# Patient Record
Sex: FEMALE | Race: BLACK OR AFRICAN AMERICAN | NOT HISPANIC OR LATINO | Employment: OTHER | URBAN - METROPOLITAN AREA
[De-identification: names, ages, dates, MRNs, and addresses within clinical notes are randomized per-mention and may not be internally consistent; named-entity substitution may affect disease eponyms.]

---

## 2024-01-11 ENCOUNTER — V-VISIT (OUTPATIENT)
Dept: URGENT CARE | Age: 64
End: 2024-01-11

## 2024-01-11 VITALS
SYSTOLIC BLOOD PRESSURE: 141 MMHG | HEART RATE: 83 BPM | WEIGHT: 199 LBS | RESPIRATION RATE: 16 BRPM | OXYGEN SATURATION: 99 % | TEMPERATURE: 96.9 F | HEIGHT: 64 IN | DIASTOLIC BLOOD PRESSURE: 82 MMHG | BODY MASS INDEX: 33.97 KG/M2

## 2024-01-11 DIAGNOSIS — U07.1 COVID-19 VIRUS INFECTION: Primary | ICD-10-CM

## 2024-01-11 LAB
FLUAV AG UPPER RESP QL IA.RAPID: NEGATIVE
FLUBV AG UPPER RESP QL IA.RAPID: NEGATIVE
SARS-COV+SARS-COV-2 AG RESP QL IA.RAPID: DETECTED
TEST LOT EXPIRATION DATE: ABNORMAL
TEST LOT NUMBER: ABNORMAL

## 2024-01-11 PROCEDURE — 99202 OFFICE O/P NEW SF 15 MIN: CPT | Performed by: NURSE PRACTITIONER

## 2024-01-11 PROCEDURE — 87428 SARSCOV & INF VIR A&B AG IA: CPT | Performed by: NURSE PRACTITIONER

## 2024-01-11 RX ORDER — KETOCONAZOLE 20 MG/G
1 CREAM TOPICAL 2 TIMES DAILY
COMMUNITY
Start: 2024-01-04

## 2024-01-11 RX ORDER — PREDNISONE 20 MG/1
40 TABLET ORAL DAILY
Qty: 10 TABLET | Refills: 0 | Status: SHIPPED | OUTPATIENT
Start: 2024-01-11 | End: 2024-01-16

## 2024-01-11 RX ORDER — TRIAMCINOLONE ACETONIDE 1 MG/G
1 OINTMENT TOPICAL 2 TIMES DAILY
COMMUNITY
Start: 2024-01-05

## 2024-01-11 RX ORDER — ATORVASTATIN CALCIUM 20 MG/1
20 TABLET, FILM COATED ORAL DAILY
COMMUNITY
Start: 2023-11-29

## 2024-01-11 RX ORDER — FLUTICASONE PROPIONATE 50 MCG
1 SPRAY, SUSPENSION (ML) NASAL DAILY
COMMUNITY
Start: 2023-12-26

## 2024-01-11 RX ORDER — ALBUTEROL SULFATE 90 UG/1
2 AEROSOL, METERED RESPIRATORY (INHALATION) EVERY 4 HOURS PRN
COMMUNITY
Start: 2024-01-04

## 2024-01-11 RX ORDER — LOSARTAN POTASSIUM AND HYDROCHLOROTHIAZIDE 12.5; 1 MG/1; MG/1
1 TABLET ORAL DAILY
COMMUNITY
Start: 2023-12-07

## 2024-01-11 ASSESSMENT — ENCOUNTER SYMPTOMS
CHILLS: 1
WHEEZING: 1
SORE THROAT: 0
FEVER: 0
SHORTNESS OF BREATH: 0
COUGH: 1

## 2024-01-11 ASSESSMENT — PAIN SCALES - GENERAL: PAINLEVEL: 0

## 2024-01-31 ENCOUNTER — HOSPITAL ENCOUNTER (OUTPATIENT)
Dept: GENERAL RADIOLOGY | Age: 64
Discharge: HOME | End: 2024-01-31
Attending: FAMILY MEDICINE
Payer: MEDICARE

## 2024-01-31 ENCOUNTER — OFFICE VISIT (OUTPATIENT)
Dept: FAMILY MEDICINE CLINIC | Facility: CLINIC | Age: 64
End: 2024-01-31
Payer: COMMERCIAL

## 2024-01-31 VITALS
BODY MASS INDEX: 35.07 KG/M2 | OXYGEN SATURATION: 98 % | HEART RATE: 88 BPM | HEIGHT: 62.25 IN | SYSTOLIC BLOOD PRESSURE: 110 MMHG | WEIGHT: 193 LBS | DIASTOLIC BLOOD PRESSURE: 68 MMHG | RESPIRATION RATE: 16 BRPM

## 2024-01-31 DIAGNOSIS — G57.11 MERALGIA PARESTHETICA OF RIGHT SIDE: ICD-10-CM

## 2024-01-31 DIAGNOSIS — J30.9 ALLERGIC RHINITIS, UNSPECIFIED SEASONALITY, UNSPECIFIED TRIGGER: ICD-10-CM

## 2024-01-31 DIAGNOSIS — E11.9 TYPE 2 DIABETES MELLITUS WITHOUT COMPLICATION, WITHOUT LONG-TERM CURRENT USE OF INSULIN (HCC): Primary | ICD-10-CM

## 2024-01-31 DIAGNOSIS — Z78.0 POST-MENOPAUSAL: ICD-10-CM

## 2024-01-31 DIAGNOSIS — J45.20 MILD INTERMITTENT ASTHMA WITHOUT COMPLICATION: ICD-10-CM

## 2024-01-31 DIAGNOSIS — Z12.31 ENCOUNTER FOR SCREENING MAMMOGRAM FOR BREAST CANCER: ICD-10-CM

## 2024-01-31 DIAGNOSIS — I10 ESSENTIAL HYPERTENSION: ICD-10-CM

## 2024-01-31 DIAGNOSIS — Z00.00 LABORATORY EXAMINATION ORDERED AS PART OF A ROUTINE GENERAL MEDICAL EXAMINATION: ICD-10-CM

## 2024-01-31 DIAGNOSIS — E78.5 HYPERLIPIDEMIA, UNSPECIFIED HYPERLIPIDEMIA TYPE: ICD-10-CM

## 2024-01-31 DIAGNOSIS — R05.2 SUBACUTE COUGH: ICD-10-CM

## 2024-01-31 PROBLEM — J44.89 ASTHMA WITH COPD (CHRONIC OBSTRUCTIVE PULMONARY DISEASE): Chronic | Status: ACTIVE | Noted: 2024-01-31

## 2024-01-31 PROBLEM — J44.89 ASTHMA WITH COPD (CHRONIC OBSTRUCTIVE PULMONARY DISEASE) (HCC): Chronic | Status: ACTIVE | Noted: 2024-01-31

## 2024-01-31 PROCEDURE — 71046 X-RAY EXAM CHEST 2 VIEWS: CPT | Performed by: FAMILY MEDICINE

## 2024-01-31 PROCEDURE — 3008F BODY MASS INDEX DOCD: CPT | Performed by: FAMILY MEDICINE

## 2024-01-31 PROCEDURE — 3078F DIAST BP <80 MM HG: CPT | Performed by: FAMILY MEDICINE

## 2024-01-31 PROCEDURE — 3074F SYST BP LT 130 MM HG: CPT | Performed by: FAMILY MEDICINE

## 2024-01-31 PROCEDURE — 99204 OFFICE O/P NEW MOD 45 MIN: CPT | Performed by: FAMILY MEDICINE

## 2024-01-31 RX ORDER — LOSARTAN POTASSIUM AND HYDROCHLOROTHIAZIDE 12.5; 1 MG/1; MG/1
1 TABLET ORAL DAILY
COMMUNITY
Start: 2023-12-07

## 2024-01-31 RX ORDER — BLOOD SUGAR DIAGNOSTIC
STRIP MISCELLANEOUS
Qty: 100 STRIP | Refills: 3 | Status: SHIPPED | OUTPATIENT
Start: 2024-01-31 | End: 2025-01-30

## 2024-01-31 RX ORDER — ALBUTEROL SULFATE 90 UG/1
2 AEROSOL, METERED RESPIRATORY (INHALATION) EVERY 4 HOURS PRN
COMMUNITY
Start: 2023-08-23

## 2024-01-31 RX ORDER — METHYLPREDNISOLONE 4 MG/1
TABLET ORAL
Qty: 21 EACH | Refills: 0 | Status: SHIPPED | OUTPATIENT
Start: 2024-01-31

## 2024-01-31 RX ORDER — LANCETS
EACH MISCELLANEOUS
Qty: 100 EACH | Refills: 3 | Status: SHIPPED | OUTPATIENT
Start: 2024-01-31

## 2024-01-31 RX ORDER — BLOOD-GLUCOSE METER
1 EACH MISCELLANEOUS DAILY
Qty: 1 KIT | Refills: 0 | Status: SHIPPED | OUTPATIENT
Start: 2024-01-31

## 2024-01-31 RX ORDER — TRIAMCINOLONE ACETONIDE 1 MG/G
1 OINTMENT TOPICAL 2 TIMES DAILY
COMMUNITY
Start: 2024-01-05

## 2024-01-31 RX ORDER — FLUTICASONE PROPIONATE 50 MCG
1 SPRAY, SUSPENSION (ML) NASAL DAILY
COMMUNITY
Start: 2023-08-23

## 2024-01-31 RX ORDER — ATORVASTATIN CALCIUM 20 MG/1
20 TABLET, FILM COATED ORAL DAILY
COMMUNITY
Start: 2023-08-23

## 2024-01-31 RX ORDER — KETOCONAZOLE 20 MG/G
1 CREAM TOPICAL 2 TIMES DAILY
COMMUNITY
Start: 2023-08-08

## 2024-01-31 RX ORDER — CHLORPHENIR/PHENYLEPH/ASPIRIN 2-7.8-325
1 TABLET, EFFERVESCENT ORAL DAILY
COMMUNITY
Start: 2022-07-28

## 2024-01-31 RX ORDER — BENZONATATE 200 MG/1
200 CAPSULE ORAL 3 TIMES DAILY PRN
Qty: 30 CAPSULE | Refills: 0 | Status: SHIPPED | OUTPATIENT
Start: 2024-01-31

## 2024-01-31 NOTE — PROGRESS NOTES
[facility] Office Note  Chief Complaint:   Chief Complaint   Patient presents with    [facility]     NP / Hx of hypertension and diabetes II x 20 yrs , control with medication, last A1C 7.2 %.       Cough     C/o on and off cough since November, Pt was recently sick with Covid x 1/11/2024. Pt currently using Ventolin every day.          HPI:   This is a 64 year old female coming in to establish care.     She has a history of hypertension that was diagnosed 20+ years ago.     She has a history of T2DM that was diagnosed 20+ years ago. She has been controlled on oral medications. Has not been on any insulin. Denies any hypoglycemic episodes. No complications.     Cough - she reports intermittent cough since 11/2023. She has had multiple URI in the past. She has h/o mild intermittent asthma. She does report wheezing. No fevers. Has had sinus congestion, no purulent rhinorrhea. No chest pain, dyspnea.     Reports R outer thigh numbness. No trauma or injury. Does report that she walks a lot. Denies any radiation to lower back or lower leg. Denies any weakness. Denies any L sided symptoms.     Past Medical History:   Diagnosis Date    Diabetes (HCC)     Hypertension      Past Surgical History:   Procedure Laterality Date    COLONOSCOPY  12/28/2023    HYSTERECTOMY       Social History:  Social History     Socioeconomic History    Marital status:    Tobacco Use    Smoking status: Never    Smokeless tobacco: Never   Vaping Use    Vaping Use: Never used   Substance and Sexual Activity    Alcohol use: Never    Drug use: Never     Family History:  Family History   Problem Relation Age of Onset    Diabetes Mother     Other (circulation problems) Mother     Colon Cancer Father      Allergies:  No Known Allergies  Current Meds:  Current Outpatient Medications   Medication Sig Dispense Refill    VENTOLIN  (90 Base) MCG/ACT Inhalation Aero Soln Inhale 2 puffs into the lungs every 4 (four)  hours as needed for Wheezing or Shortness of Breath.      ascorbic acid 500 MG Oral Chew Tab Chew 1 tablet (500 mg total) by mouth daily.      atorvastatin 20 MG Oral Tab Take 1 tablet (20 mg total) by mouth daily.      Cholecalciferol 50 MCG (2000 UT) Oral Cap Take 2,000 Units by mouth daily.      diclofenac 1 % External Gel Apply 1 Application topically as needed.      fluticasone propionate 50 MCG/ACT Nasal Suspension 1 spray by Nasal route daily.      ketoconazole 2 % External Cream Apply 1 Application topically 2 (two) times daily.      Losartan Potassium-HCTZ 100-12.5 MG Oral Tab Take 1 tablet by mouth daily.      metFORMIN HCl 1000 MG Oral Tab Take 1 tablet (1,000 mg total) by mouth in the morning and 1 tablet (1,000 mg total) before bedtime.      Multiple Vitamins-Minerals (ONE-A-DAY WOMENS 50 PLUS) Oral Tab Take 1 tablet by mouth daily.      Omega-3 Fatty Acids (FISH OIL) 1200 MG Oral Capsule Delayed Release Take 1 tablet by mouth daily.      triamcinolone 0.1 % External Ointment Apply 1 Application topically 2 (two) times daily.      Accu-Chek Softclix Lancets Does not apply Misc Use as directed to measure blood sugar once daily. 100 each 3    Blood Glucose Monitoring Suppl (ACCU-CHEK GUIDE ME) w/Device Does not apply Kit 1 each daily. Use as directed to measure blood sugar once daily. 1 kit 0    Glucose Blood (ACCU-CHEK GUIDE) In Vitro Strip Use as directed to measure blood sugar once daily. 100 strip 3    benzonatate 200 MG Oral Cap Take 1 capsule (200 mg total) by mouth 3 (three) times daily as needed for cough. 30 capsule 0    methylPREDNISolone (MEDROL) 4 MG Oral Tablet Therapy Pack As directed. 21 each 0      Counseling given: Not Answered       REVIEW OF SYSTEMS:   Review of Systems   A comprehensive 10 point review of systems was completed.  Pertinent positives and negatives noted in the the HPI.    EXAM:   /68   Pulse 88   Resp 16   Ht 5' 2.25\" (1.581 m)   Wt 193 lb (87.5 kg)   LMP   (LMP Unknown)   SpO2 98%   BMI 35.02 kg/m²  Estimated body mass index is 35.02 kg/m² as calculated from the following:    Height as of this encounter: 5' 2.25\" (1.581 m).    Weight as of this encounter: 193 lb (87.5 kg).   Vital signs reviewed.Appears stated age, well groomed.  Physical Exam  Constitutional:       Appearance: Normal appearance.   HENT:      Head: Normocephalic and atraumatic.   Eyes:      Pupils: Pupils are equal, round, and reactive to light.   Cardiovascular:      Rate and Rhythm: Normal rate and regular rhythm.      Pulses: Normal pulses.      Heart sounds: Normal heart sounds. No murmur heard.  Pulmonary:      Effort: Pulmonary effort is normal. No respiratory distress.      Breath sounds: Normal breath sounds. No wheezing or rhonchi.   Abdominal:      General: Abdomen is flat. Bowel sounds are normal. There is no distension.      Palpations: Abdomen is soft. There is no mass.      Tenderness: There is no abdominal tenderness.      Hernia: No hernia is present.   Musculoskeletal:      Right hip: Normal. No deformity, tenderness, bony tenderness or crepitus. Normal range of motion. Normal strength.      Left hip: Normal.      Right lower leg: No edema.      Left lower leg: No edema.   Skin:     Findings: No rash.   Neurological:      General: No focal deficit present.      Mental Status: She is alert and oriented to person, place, and time.      Sensory: No sensory deficit.   Psychiatric:         Mood and Affect: Mood normal.          ASSESSMENT AND PLAN:   1. Type 2 diabetes mellitus without complication, without long-term current use of insulin (HCC)  Has been somewhat stable but could benefit from better control. Will obtain labs and adjust medications accordingly. She needs new RX for testing supplies, will order. Will f/u in 1mo. CPM.   - Hemoglobin A1C  - Microalb/Creat Ratio, Random Urine  - Accu-Chek Softclix Lancets Does not apply Misc; Use as directed to measure blood sugar once  daily.  Dispense: 100 each; Refill: 3  - Blood Glucose Monitoring Suppl (ACCU-CHEK GUIDE ME) w/Device Does not apply Kit; 1 each daily. Use as directed to measure blood sugar once daily.  Dispense: 1 kit; Refill: 0  - Glucose Blood (ACCU-CHEK GUIDE) In Vitro Strip; Use as directed to measure blood sugar once daily.  Dispense: 100 strip; Refill: 3    2. Essential hypertension  Stable, CPM. Due for labs.     3. Hyperlipidemia, unspecified hyperlipidemia type  Stable, CPM. Due for labs.     4. Encounter for screening mammogram for breast cancer  - [facility] WILLIAN 2D+3D SCREENING BILAT (CPT=77067/70435); Future    5. Laboratory examination ordered as part of a routine general medical examination  - CBC With Differential With Platelet  - Comp Metabolic Panel (14)  - Lipid Panel  - TSH W Reflex To Free T4    6. Post-menopausal  - XR DEXA BONE DENSITOMETRY (CPT=77080); Future    7. Subacute cough  Will obtain imaging. Trial tessalon perles. If no improvement can start medrol dose pack.  Reviewed return precautions. Consider course of abx if no improvement. F/u 1mo or sooner prn. Advised to send us condition update within the next week.   - XR CHEST PA + LAT CHEST (CPT=71046); Future  - benzonatate 200 MG Oral Cap; Take 1 capsule (200 mg total) by mouth 3 (three) times daily as needed for cough.  Dispense: 30 capsule; Refill: 0  - methylPREDNISolone (MEDROL) 4 MG Oral Tablet Therapy Pack; As directed.  Dispense: 21 each; Refill: 0    8. Mild intermittent asthma without complication  Stable, CPM. No wheezing auscultated on exam. See above.     9. Meralgia paresthetica of right side  Suspect R meralgia paresthetica. Provided w/ exercises.     10. Allergic rhinitis, unspecified seasonality, unspecified trigger  Advised on antihistamine, flonase, nasal saline spray.       Meds & Refills for this Visit:  Requested Prescriptions     Signed Prescriptions Disp Refills    Accu-Chek Softclix Lancets Does not apply Misc 100 each 3     Sig:  Use as directed to measure blood sugar once daily.    Blood Glucose Monitoring Suppl (ACCU-CHEK GUIDE ME) w/Device Does not apply Kit 1 kit 0     Si each daily. Use as directed to measure blood sugar once daily.    Glucose Blood (ACCU-CHEK GUIDE) In Vitro Strip 100 strip 3     Sig: Use as directed to measure blood sugar once daily.    benzonatate 200 MG Oral Cap 30 capsule 0     Sig: Take 1 capsule (200 mg total) by mouth 3 (three) times daily as needed for cough.    methylPREDNISolone (MEDROL) 4 MG Oral Tablet Therapy Pack 21 each 0     Sig: As directed.       Health Maintenance:  Health Maintenance Due   Topic Date Due    Annual Physical  Never done    Mammogram  Never done    COVID-19 Vaccine ( season) 2023    Annual Depression Screening  Never done    Fall Risk Screening (Annual)  Never done    DEXA Scan  Never done       Patient/Caregiver Education: Patient/Caregiver Education: There are no barriers to learning. Medical education done.   Outcome: Patient verbalizes understanding. Patient is notified to call with any questions, complications, allergies, or worsening or changing symptoms.  Patient is to call with any side effects or complications from the treatments as a result of today.     Problem List:  Patient Active Problem List   Diagnosis    Mild intermittent asthma without complication    Hyperlipidemia    Essential hypertension    Type 2 diabetes mellitus without complication, without long-term current use of insulin (HCC)    Asthma with COPD (chronic obstructive pulmonary disease) ([facility])

## 2024-01-31 NOTE — PATIENT INSTRUCTIONS
Complete labs  Complete mammogram  Complete bone density scan  Complete chest xray.   Start tessalon perles as needed for cough. If this doesn't improve your cough then please let us know. You can also start the steroid pack if cough doesn't improve with the tessalon perles. Make sure to take the steroid pills during the day so it doesn't cause you insomnia.   Start exercises for your right thigh numbness.

## 2024-02-01 ENCOUNTER — MED REC SCAN ONLY (OUTPATIENT)
Dept: FAMILY MEDICINE CLINIC | Facility: CLINIC | Age: 64
End: 2024-02-01

## 2024-02-01 ENCOUNTER — TELEPHONE (OUTPATIENT)
Dept: FAMILY MEDICINE CLINIC | Facility: CLINIC | Age: 64
End: 2024-02-01

## 2024-02-05 ENCOUNTER — LAB ENCOUNTER (OUTPATIENT)
Dept: LAB | Age: 64
End: 2024-02-05
Attending: FAMILY MEDICINE
Payer: MEDICARE

## 2024-02-05 LAB
ALBUMIN SERPL-MCNC: 3.6 G/DL (ref 3.4–5)
ALBUMIN/GLOB SERPL: 1.1 {RATIO} (ref 1–2)
ALP LIVER SERPL-CCNC: 76 U/L
ALT SERPL-CCNC: 21 U/L
ANION GAP SERPL CALC-SCNC: 5 MMOL/L (ref 0–18)
AST SERPL-CCNC: 13 U/L (ref 15–37)
BASOPHILS # BLD AUTO: 0.02 X10(3) UL (ref 0–0.2)
BASOPHILS NFR BLD AUTO: 0.5 %
BILIRUB SERPL-MCNC: 0.5 MG/DL (ref 0.1–2)
BUN BLD-MCNC: 9 MG/DL (ref 9–23)
CALCIUM BLD-MCNC: 9.5 MG/DL (ref 8.5–10.1)
CHLORIDE SERPL-SCNC: 107 MMOL/L (ref 98–112)
CHOLEST SERPL-MCNC: 141 MG/DL (ref ?–200)
CO2 SERPL-SCNC: 28 MMOL/L (ref 21–32)
CREAT BLD-MCNC: 0.73 MG/DL
CREAT UR-SCNC: 82.6 MG/DL
EGFRCR SERPLBLD CKD-EPI 2021: 91 ML/MIN/1.73M2 (ref 60–?)
EOSINOPHIL # BLD AUTO: 0.09 X10(3) UL (ref 0–0.7)
EOSINOPHIL NFR BLD AUTO: 2.4 %
ERYTHROCYTE [DISTWIDTH] IN BLOOD BY AUTOMATED COUNT: 15.7 %
EST. AVERAGE GLUCOSE BLD GHB EST-MCNC: 189 MG/DL (ref 68–126)
FASTING PATIENT LIPID ANSWER: YES
FASTING STATUS PATIENT QL REPORTED: YES
GLOBULIN PLAS-MCNC: 3.3 G/DL (ref 2.8–4.4)
GLUCOSE BLD-MCNC: 181 MG/DL (ref 70–99)
HBA1C MFR BLD: 8.2 % (ref ?–5.7)
HCT VFR BLD AUTO: 35.7 %
HDLC SERPL-MCNC: 91 MG/DL (ref 40–59)
HGB BLD-MCNC: 11.3 G/DL
IMM GRANULOCYTES # BLD AUTO: 0.01 X10(3) UL (ref 0–1)
IMM GRANULOCYTES NFR BLD: 0.3 %
LDLC SERPL CALC-MCNC: 39 MG/DL (ref ?–100)
LYMPHOCYTES # BLD AUTO: 1.62 X10(3) UL (ref 1–4)
LYMPHOCYTES NFR BLD AUTO: 43.8 %
MCH RBC QN AUTO: 29.3 PG (ref 26–34)
MCHC RBC AUTO-ENTMCNC: 31.7 G/DL (ref 31–37)
MCV RBC AUTO: 92.5 FL
MICROALBUMIN UR-MCNC: 0.68 MG/DL
MICROALBUMIN/CREAT 24H UR-RTO: 8.2 UG/MG (ref ?–30)
MONOCYTES # BLD AUTO: 0.35 X10(3) UL (ref 0.1–1)
MONOCYTES NFR BLD AUTO: 9.5 %
NEUTROPHILS # BLD AUTO: 1.61 X10 (3) UL (ref 1.5–7.7)
NEUTROPHILS # BLD AUTO: 1.61 X10(3) UL (ref 1.5–7.7)
NEUTROPHILS NFR BLD AUTO: 43.5 %
NONHDLC SERPL-MCNC: 50 MG/DL (ref ?–130)
OSMOLALITY SERPL CALC.SUM OF ELEC: 293 MOSM/KG (ref 275–295)
PLATELET # BLD AUTO: 294 10(3)UL (ref 150–450)
POTASSIUM SERPL-SCNC: 4.2 MMOL/L (ref 3.5–5.1)
PROT SERPL-MCNC: 6.9 G/DL (ref 6.4–8.2)
RBC # BLD AUTO: 3.86 X10(6)UL
SODIUM SERPL-SCNC: 140 MMOL/L (ref 136–145)
TRIGL SERPL-MCNC: 45 MG/DL (ref 30–149)
TSI SER-ACNC: 2.26 MIU/ML (ref 0.36–3.74)
VLDLC SERPL CALC-MCNC: 6 MG/DL (ref 0–30)
WBC # BLD AUTO: 3.7 X10(3) UL (ref 4–11)

## 2024-02-12 ENCOUNTER — HOSPITAL ENCOUNTER (OUTPATIENT)
Dept: BONE DENSITY | Age: 64
Discharge: HOME | End: 2024-02-12
Attending: FAMILY MEDICINE
Payer: MEDICARE

## 2024-02-12 ENCOUNTER — HOSPITAL ENCOUNTER (OUTPATIENT)
Dept: MAMMOGRAPHY | Age: 64
Discharge: HOME | End: 2024-02-12
Attending: FAMILY MEDICINE
Payer: MEDICARE

## 2024-02-12 DIAGNOSIS — Z12.31 ENCOUNTER FOR SCREENING MAMMOGRAM FOR BREAST CANCER: ICD-10-CM

## 2024-02-12 DIAGNOSIS — Z78.0 POST-MENOPAUSAL: ICD-10-CM

## 2024-02-12 PROCEDURE — 77063 BREAST TOMOSYNTHESIS BI: CPT | Performed by: FAMILY MEDICINE

## 2024-02-12 PROCEDURE — 77067 SCR MAMMO BI INCL CAD: CPT | Performed by: FAMILY MEDICINE

## 2024-02-12 PROCEDURE — 77080 DXA BONE DENSITY AXIAL: CPT | Performed by: FAMILY MEDICINE

## 2024-02-23 ENCOUNTER — TELEPHONE (OUTPATIENT)
Dept: FAMILY MEDICINE CLINIC | Facility: CLINIC | Age: 64
End: 2024-02-23

## 2024-02-29 ENCOUNTER — OFFICE VISIT (OUTPATIENT)
Dept: FAMILY MEDICINE CLINIC | Facility: CLINIC | Age: 64
End: 2024-02-29
Payer: COMMERCIAL

## 2024-02-29 VITALS
BODY MASS INDEX: 35.07 KG/M2 | HEIGHT: 62.25 IN | RESPIRATION RATE: 16 BRPM | OXYGEN SATURATION: 97 % | WEIGHT: 193 LBS | DIASTOLIC BLOOD PRESSURE: 68 MMHG | SYSTOLIC BLOOD PRESSURE: 112 MMHG | HEART RATE: 83 BPM

## 2024-02-29 DIAGNOSIS — B35.1 ONYCHOMYCOSIS: ICD-10-CM

## 2024-02-29 DIAGNOSIS — D64.9 ANEMIA, UNSPECIFIED TYPE: ICD-10-CM

## 2024-02-29 DIAGNOSIS — R05.2 SUBACUTE COUGH: ICD-10-CM

## 2024-02-29 DIAGNOSIS — J45.30 MILD PERSISTENT ASTHMA WITHOUT COMPLICATION (HCC): ICD-10-CM

## 2024-02-29 DIAGNOSIS — E11.9 TYPE 2 DIABETES MELLITUS WITHOUT COMPLICATION, WITHOUT LONG-TERM CURRENT USE OF INSULIN (HCC): Primary | ICD-10-CM

## 2024-02-29 PROBLEM — J44.89 ASTHMA WITH COPD (CHRONIC OBSTRUCTIVE PULMONARY DISEASE) (HCC): Chronic | Status: ACTIVE | Noted: 2024-02-29

## 2024-02-29 PROBLEM — J44.89 ASTHMA WITH COPD (CHRONIC OBSTRUCTIVE PULMONARY DISEASE): Chronic | Status: ACTIVE | Noted: 2024-02-29

## 2024-02-29 RX ORDER — FLUTICASONE PROPIONATE AND SALMETEROL 50; 100 UG/1; UG/1
1 POWDER RESPIRATORY (INHALATION) 2 TIMES DAILY
Qty: 60 EACH | Refills: 2 | Status: SHIPPED | OUTPATIENT
Start: 2024-02-29

## 2024-02-29 RX ORDER — FLASH GLUCOSE SENSOR
1 KIT MISCELLANEOUS
Qty: 2 EACH | Refills: 11 | Status: SHIPPED | OUTPATIENT
Start: 2024-02-29

## 2024-02-29 RX ORDER — FLASH GLUCOSE SCANNING READER
1 EACH MISCELLANEOUS ONCE
Qty: 1 EACH | Refills: 0 | Status: SHIPPED | OUTPATIENT
Start: 2024-02-29 | End: 2024-02-29

## 2024-02-29 NOTE — PROGRESS NOTES
[facility] Office Note  Chief Complaint:   Chief Complaint   Patient presents with    Follow - Up    Diabetes    Cough     Pt still c/o cough , Pt noticed improvement after done with Medrol dose pack but symptoms returned yesterday. Pt has been taking  Tessalon pearls / nasal spray and inhaler daily        HPI:   This is a 64 year old female coming in for    T2DM   -Last A1c value was 8.2% done 2024. A1c in  was 7.4%.   -Has had difficulty with her glucometer so she has not been able to check her blood sugar  -Denies any hypoglycemic changes.  -Has been trying to make dietary changes after seeing her most recent A1c value. Does walk daily for exercise.     Cough   -Improved w/ steroid pack but then returned after she completed it.   -She has been using albuterol w/ minimal relief for cough  -no new sx. No fevers, SOB, wheezing, chest tightness.   -CXR was negative for any PNA.         Past Medical History:   Diagnosis Date    Arthritis     Asthma (HCC)     Diabetes (HCC)     Hypertension      Past Surgical History:   Procedure Laterality Date    COLONOSCOPY  2023    COLONOSCOPY  2023    HYSTERECTOMY        1988    TUBAL LIGATION  20years     Social History:  Social History     Socioeconomic History    Marital status:    Tobacco Use    Smoking status: Never    Smokeless tobacco: Never   Vaping Use    Vaping Use: Never used   Substance and Sexual Activity    Alcohol use: Never    Drug use: Never   Other Topics Concern    Caffeine Concern No    Exercise No    Seat Belt No    Special Diet No    Stress Concern No    Weight Concern No     Family History:  Family History   Problem Relation Age of Onset    Diabetes Mother     Other (circulation problems) Mother     Colon Cancer Father     Cancer Father      Allergies:  No Known Allergies  Current Meds:  Current Outpatient Medications   Medication Sig Dispense Refill    VENTOLIN  (90 Base) MCG/ACT  Inhalation Aero Soln Inhale 2 puffs into the lungs every 4 (four) hours as needed for Wheezing or Shortness of Breath.      ascorbic acid 500 MG Oral Chew Tab Chew 1 tablet (500 mg total) by mouth daily.      atorvastatin 20 MG Oral Tab Take 1 tablet (20 mg total) by mouth daily.      Cholecalciferol 50 MCG (2000 UT) Oral Cap Take 2,000 Units by mouth daily.      diclofenac 1 % External Gel Apply 1 Application topically as needed.      fluticasone propionate 50 MCG/ACT Nasal Suspension 1 spray by Nasal route daily.      ketoconazole 2 % External Cream Apply 1 Application topically 2 (two) times daily.      Losartan Potassium-HCTZ 100-12.5 MG Oral Tab Take 1 tablet by mouth daily.      metFORMIN HCl 1000 MG Oral Tab Take 1 tablet (1,000 mg total) by mouth in the morning and 1 tablet (1,000 mg total) before bedtime.      Multiple Vitamins-Minerals (ONE-A-DAY WOMENS 50 PLUS) Oral Tab Take 1 tablet by mouth daily.      Omega-3 Fatty Acids (FISH OIL) 1200 MG Oral Capsule Delayed Release Take 1 tablet by mouth daily.      triamcinolone 0.1 % External Ointment Apply 1 Application topically 2 (two) times daily.      Accu-Chek Softclix Lancets Does not apply Misc Use as directed to measure blood sugar once daily. 100 each 3    Blood Glucose Monitoring Suppl (ACCU-CHEK GUIDE ME) w/Device Does not apply Kit 1 each daily. Use as directed to measure blood sugar once daily. 1 kit 0    Glucose Blood (ACCU-CHEK GUIDE) In Vitro Strip Use as directed to measure blood sugar once daily. 100 strip 3    benzonatate 200 MG Oral Cap Take 1 capsule (200 mg total) by mouth 3 (three) times daily as needed for cough. 30 capsule 0    TURMERIC OR Take 1 tablet by mouth As Directed.        Counseling given: Not Answered       REVIEW OF SYSTEMS:   Review of Systems   A comprehensive 10 point review of systems was completed.  Pertinent positives and negatives noted in the the HPI.    EXAM:   Pulse 83   Resp 16   Ht 5' 2.25\" (1.581 m)   Wt 193 lb  (87.5 kg)   LMP  (LMP Unknown)   SpO2 97%   BMI 35.02 kg/m²  Estimated body mass index is 35.02 kg/m² as calculated from the following:    Height as of this encounter: 5' 2.25\" (1.581 m).    Weight as of this encounter: 193 lb (87.5 kg).   Vital signs reviewed.Appears stated age, well groomed.  Physical Exam  Vitals and nursing note reviewed.   Constitutional:       Appearance: Normal appearance.   HENT:      Head: Normocephalic and atraumatic.   Cardiovascular:      Rate and Rhythm: Normal rate and regular rhythm.      Pulses: Normal pulses.      Heart sounds: Normal heart sounds. No murmur heard.  Pulmonary:      Effort: Pulmonary effort is normal. No respiratory distress.      Breath sounds: Normal breath sounds. No wheezing.   Skin:     Findings: No rash.   Neurological:      Mental Status: She is alert and oriented to person, place, and time.   Psychiatric:         Mood and Affect: Mood normal.      Bilateral barefoot skin diabetic exam is normal, visualized feet and the appearance is normal.  Bilateral monofilament/sensation of both feet is normal.  Pulsation pedal pulse exam of both lower legs/feet is normal as well. Onychomycosis b/l      ASSESSMENT AND PLAN:   1. Type 2 diabetes mellitus without complication, without long-term current use of insulin (HCC)  Needs better control, not at goal. Last A1c value was 8.2% done 2/5/2024.  . Wants to try lifestyle mgmt before adding additional agent for control. F/u 3mo.   Goal: A1c < 7   Medications: metformin   Diet & exercise: diabetic diet, regular exericse.   Daily accucheks are appropriate; will send in Rx for CGM.   Hypoglycemic episodes?: none   Labs: UTD  CV: continue statin.   Renal: UTD  Eye: due   Foot: UTD  Immunizations: UTD  RTC in 3mo for T2DM follow up.     - Continuous Blood Gluc  (FREESTYLE BHAVIN 14 DAY READER) Does not apply Device; 1 each one time for 1 dose.  Dispense: 1 each; Refill: 0  - Continuous Blood Gluc Sensor (FREESTYLE BHAVIN  14 DAY SENSOR) Does not apply Misc; 1 each every 14 (fourteen) days.  Dispense: 2 each; Refill: 11  - Hemoglobin A1C [E]; Future    2. Mild persistent asthma without complication (HCC)  Suspect cough 2/2 asthma given improvement w/ PO steroids. Will start ICS-LABA. Reviewed medication administration/use. Reviewd return precautions. F/u 3mo  - fluticasone-salmeterol (ADVAIR DISKUS) 100-50 MCG/ACT Inhalation Aerosol Powder, Breath Activated; Inhale 1 puff into the lungs 2 (two) times daily.  Dispense: 60 each; Refill: 2    3. Subacute cough  See above   - fluticasone-salmeterol (ADVAIR DISKUS) 100-50 MCG/ACT Inhalation Aerosol Powder, Breath Activated; Inhale 1 puff into the lungs 2 (two) times daily.  Dispense: 60 each; Refill: 2    4. Onychomycosis  Using topical antifungal; follow up if no improvement can consider terbinafine course.     5. Anemia, unspecified type  H/o iron def - will add on labs.   - Ferritin [E]; Future  - Iron And Tibc; Future      Meds & Refills for this Visit:  Requested Prescriptions     Pending Prescriptions Disp Refills    fluticasone furoate-vilanterol (BREO ELLIPTA) 100-25 MCG/ACT Inhalation Aerosol Powder, Breath Activated  0     Sig: Inhale 1 puff into the lungs daily.       Health Maintenance:  Health Maintenance Due   Topic Date Due    Diabetes Care Foot Exam  Never done    Diabetes Care Dilated Eye Exam  Never done    COVID-19 Vaccine (5 - 2023-24 season) 09/01/2023    MA Annual Health Assessment  Never done    Annual Depression Screening  Never done    Fall Risk Screening (Annual)  Never done       Patient/Caregiver Education: Patient/Caregiver Education: There are no barriers to learning. Medical education done.   Outcome: Patient verbalizes understanding. Patient is notified to call with any questions, complications, allergies, or worsening or changing symptoms.  Patient is to call with any side effects or complications from the treatments as a result of today.     Problem  List:  Patient Active Problem List   Diagnosis    Mild intermittent asthma without complication (HCC)    Hyperlipidemia    Essential hypertension    Type 2 diabetes mellitus without complication, without long-term current use of insulin (HCC)    Asthma with COPD (chronic obstructive pulmonary disease) (HCC)

## 2024-02-29 NOTE — PATIENT INSTRUCTIONS
[employer]  What is Type 2 Diabetes?  Watch this clip to understand what happens within your body when you have type 2 diabetes, and the importance of keeping your blood glucose levels within a healthy range.  To watch the video:  Scan the QR code  Using your mobile device, scan the following code:  OR  Go to the website:  Ratify  Enter the prescription code:  1576E    © 8452-9294 [employer]. All rights reserved. This information is not intended as a substitute for professional medical care. Always follow your healthcare professional's instructions.    Managing Type 2 Diabetes  Type 2 diabetes is a long-term (chronic) condition. Managing diabetes may mean making some tough changes. Yourhealthcare team can help you.  To manage type 2 diabetes, you'll need to balance your medicine with diet and activity. You will also need check your blood sugar. And work with yourhealthcare provider to prevent complications.    Take your medicine  You may take pills or give yourself insulin shots for diabetes. Or you may use both. Take your medicines or give yourself insulin at the right times to help you control your blood sugar. Think about ways that will help you remember to take your medicines the right way every day. Ask your healthcare provider orteam for ideas.  You may only take pills for your diabetes. But this may change. Over time, mostpeople with type 2 diabetes also need insulin.  Eat healthy  A healthy diet helps control the amount of sugar in your blood. It also helps you stay at a healthy weight. Or it helps you lose weight, if if you'reoverweight. Extra weight makes it harder to control diabetes.  Your healthcare team will help you create a plan that works for you. You don'thave to give up all the foods you like. Have meals and snacks with:  Vegetables  Fruits  Lean meats or other healthy proteins  Whole grains  Low- or nonfat dairy products     Be physically  active  Being active helps lower your blood sugar. Activity helps your body use insulinto turn food into energy. It also helps you manage your weight:  Ask your healthcare provider to help you to make an activity program that's right for you. Your program is based on your age, general health, and types of activity you enjoy. Start slow. But aim for at least 150 minutes of exercise or activity each week. Start with 30 minutes a day. Exercise in 10-minute blocks. Don’t let more than 2 days go by without being active.  Check your blood sugar  Checking your own blood sugar may be a regular part of your care. Or you may only need to check your blood sugar from time to time. Your healthcare provider will tell you how to check your blood sugar at home. Checking it tells you if your blood sugar is in your target range. Having your blood sugars within thetarget range means that you are managing your diabetes well.  If your blood sugar levels are too high or too low, your healthcare provider may suggest changes to your diet or activity level. He or she may also adjustyour medicine.  Your healthcare provider may also tell you to check your blood sugar more oftenwhen you are sick.  Take care of yourself  When you have diabetes, you may be more likely to get other health problems. They include foot, eye, heart, nerve, and kidney problems. You can help prevent these problems by controlling your blood sugar and taking good care of yourself. Your healthcare provider, nurse, diabetes educator, and others canhelp you with the following:  Checkups. You should have regular checkups with your healthcare provider. At those visits, you will have a physical exam that includes checking your feet. Your healthcare provider will also check your blood pressure and weight. Take your shoes off before your appointment starts to be sure your feet are checked.  Other exams. You'll also need eye, foot, and dental exams at least once each year.  Lab  tests. You will have blood and urine tests:  Your healthcare provider will check your hemoglobin A1C at least twice a year. This blood test shows how well you have been controlling your blood sugar over 2 to 3 months. The results help your healthcare provider manage your diabetes.    You will also have other lab tests. For example, to check for kidney problems and abnormal cholesterol levels.  Smoking. If you smoke, you will need to quit. Smoking makes it more likely to get complications from diabetes. Ask your healthcare provider about ways to quit. Also don't use e-cigarette, or vaping, products.  Vaccines. Get a yearly flu shot. And ask your healthcare provider about vaccines to prevent pneumonia, shingles, and hepatitis B.  Stress and depression  Most people have challenges throughout their lives. Living with diabetes can increase your stress. Feeling stressed or depressed can actually affect yourblood sugar levels.  Tell your healthcare provider if you are having trouble coping with diabetes.He or she can help or refer you to other providers or programs.  To learn more  Know where you can get help. You can try the following:  Support. Ask family and friends to support your efforts to take care of yourself. Or look for a diabetes support group nearby or on the internet. Check the Connect with Others link on www.diabetes.org.  Counseling. Talk with a , psychologist, psychiatrist, or other counselor.  Information. Contact the American Diabetes Association at 392-390-9975 or www.diabetes.org  [place] last reviewed this educational content on11/1/2019    © 3057-7349 [employer]. All rights reserved. This information is not intended as a substitute for professional medical care. Always follow yourhealthcare professional's instructions.    Type 2 Diabetes and Food Choices  You make food choices every day. Whole wheat or white bread? A side of French fries or fresh fruit? Eat now or later?  Choices about what, when, and how much you eat affect your blood sugar (glucose), and also your blood pressure and cholesterol. Understanding how food affects blood glucose is the first step in managing diabetes. And following a diabetesmeal plan can help you keep your blood glucose levels on track.   Prevent problems  Having type 2 diabetes means that your body doesn’t control blood glucose well. When blood glucose stays too high for too long, serious health problems can develop. It's important to control your blood glucose through diet, exercise, and medicine. This can delay or prevent kidney,eye, nerve, and heart disease, and other complications of diabetes.   Control carbohydrates  Carbohydrates are foods that have the biggest effect on your blood glucose levels. After you eat carbohydrates, your blood glucose rises. Fruit, sweet foods and drinks, starchy foods (such as bread, potatoes, and rice), and milk and milk products contain carbohydrates. Carbohydrates are important for health. But when you eat too many at once, your blood glucose can go too high. This is even more likely if you don't have or take enough insulin forthat food.   Some carbohydrates may raise blood glucose more than others. These include potatoes, sweets, and white bread. Better choices are less processed foods with more fiber and nutrients. Good options are 100% whole-wheat bread, oatmeal,brown rice, and nonstarchy vegetables.   Learn to use food labels that show added sugar. And try to find healthierchoices, particularly if you are overweight.    Food and medicine  Insulin helps glucose move from the blood into your muscle cells, where it can be used for energy. Some diabetes medicines that are taken by mouth help you make more insulin. Or they help your insulin work more efficiently. So your medicines and food plan have to work together. If you take insulin shots, you need to be very careful to match the amount of carbohydrates you eat  with your insulin dose. If you have too many carbohydrates without adjusting your insulin dose, your blood glucose might become too high. If you have too few carbohydrates, your blood glucose might be too low. Your healthcare provider ora dietitian can help you match your food choices to your medicine.   Have a meal plan  With certain medicines, it's best to eat the same amount of food at the same time every day. That keeps your glucose levels stable. And it helps your medicine work best. Physical activity is an important way to control blood glucose, too. Try to exercise at the same time every day. That way you can build the extra calories you need for exercise into your meal plan. With othermedicines, you may have more choices about how much you eat and when.   If you want to change your medicine to better fit your lifestyle, talk withyour healthcare provider.   Eat smart  You can eat the same foods as everyone else, but you have to carefully watch for certain details. That’s where your diabetes meal plan comes in. A personal meal plan tells you the time of day to eat meals and snacks, the types of food to eat, and how much. Itshould include your favorite foods. And it should focus on these healthy foods:   Whole grains, such as 100% whole-wheat bread, brown rice, and oatmeal  Nonfat or low-fat dairy products, such as nonfat milk and yogurt (but be sure these products don't have sugar added to make up for the fat removed)  Lean meats, poultry, fish, eggs, and dried beans and peas  Foods and drinks with no added sugar  Fruits and vegetables  At first, it may be helpful to use measuring cups and spoons to make sure you’re really eating the amount of food that’s in your plan. You can also use standardized portion sizes on food labels, such as 1 serving of meat being the size of a deck of cards. By checking your blood glucose 1 to 2 hours after eating, you can learn how your food choicesaffect your blood glucose.   To  create a diabetes meal plan or change a plan that’s not working for you, see a dietitian or diabetes educator. Let them know if you have any new health concerns or if your medicines have changed. Having ameal plan that you can live with will keep you at your healthy best.     © 6412-6570 [employer]. All rights reserved. This information is not intended as a substitute for professional medical care. Always follow yourhealthcare professional's instructions.    Diabetes: Caring for Your Body   When you have diabetes, your body needs special care. This care helps you stay healthy and prevent problems. Exercise and healthy eating are a part of this. You can also protect yourself by taking special care of your feet, skin, teeth,and eyes.   Caring for your feet     Follow these tips to help keep your feet healthy:  Check your feet every day for redness, blisters, cracks, dry skin, or numbness. Use a mirror to check the bottoms of your feet, if needed. Or ask for help.  Wash your feet in warm (not hot) water. Don’t soak them.  Use an emery board to keep your toenails even with the ends of your toes. File away sharp edges. A foot doctor (podiatrist) may need to cut your toenails for you.  Smooth down calluses gently or wait until your next podiatry appointment.  Keep your skin soft and smooth by putting a thin layer of skin lotion on the tops and bottoms of your feet. Don't put lotion in between your toes.  Always wear shoes or slippers, even inside your home. Make sure that shoes are correctly fitted, not too tight and not too loose. This can cause friction and rubbing of your feet. Change your socks daily. Always check shoes for foreign objects before putting them on.  Call your healthcare provider right away if your feet are numb or painful. Also call your provider if a cut or sore doesn’t heal in a few days.  Preventing skin infections   To prevent skin infections, bathe every day, but use a moderate water  temperature.. Dry yourself well, especially between your toes. Try to keep your home on the humid side during the colder months of the year to prevent your skin getting dry. Wash any cuts with warm, soapy water. Cover with a sterile bandage. Call your healthcare provider if a cut or sore doesn't heal in a fewdays, feels warm, itches, is swollen, or has a bad smell.   Caring for your teeth   Follow these guidelines for healthy teeth:  Brush your teeth twice daily.  Floss your teeth daily.  See your dentist at least twice yearly.  Keep your blood sugar in a good range.  Caring for your eyes  Have your eyes checked every year by an optometrist or ophthalmologist. Letyour healthcare provider know if you experience any of the following symptoms:   Blurred vision  Dark spots or \"holes\"  Flashes of light  Seeing more floaters than usual  Poor night vision  If you smoke, quit  Smoking is dangerous for everyone, especially people with diabetes. It can harm the blood vessels in your eyes, kidneys, nerves, and heart. It raises blood pressure. Smoking can also slow healing, so infections are more likely. Askyour healthcare provider about programs to help you stop smoking.   [employer] last reviewed this educational content on12/1/2021 © 2000-2022 [employer]. All rights reserved. This information is not intended as a substitute for professional medical care. Always follow yourhealthcare professional's instructions.

## 2024-05-02 ENCOUNTER — LAB ENCOUNTER (OUTPATIENT)
Dept: LAB | Age: 64
End: 2024-05-02
Attending: FAMILY MEDICINE
Payer: MEDICARE

## 2024-05-02 DIAGNOSIS — D64.9 ANEMIA, UNSPECIFIED TYPE: ICD-10-CM

## 2024-05-02 DIAGNOSIS — E11.9 TYPE 2 DIABETES MELLITUS WITHOUT COMPLICATION, WITHOUT LONG-TERM CURRENT USE OF INSULIN (HCC): ICD-10-CM

## 2024-05-02 LAB
DEPRECATED HBV CORE AB SER IA-ACNC: 21 NG/ML
EST. AVERAGE GLUCOSE BLD GHB EST-MCNC: 180 MG/DL (ref 68–126)
HBA1C MFR BLD: 7.9 % (ref ?–5.7)
IRON SATN MFR SERPL: 16 %
IRON SERPL-MCNC: 54 UG/DL
TIBC SERPL-MCNC: 340 UG/DL (ref 240–450)
TRANSFERRIN SERPL-MCNC: 228 MG/DL (ref 200–360)

## 2024-05-02 PROCEDURE — 83540 ASSAY OF IRON: CPT

## 2024-05-02 PROCEDURE — 83036 HEMOGLOBIN GLYCOSYLATED A1C: CPT

## 2024-05-02 PROCEDURE — 83550 IRON BINDING TEST: CPT

## 2024-05-02 PROCEDURE — 36415 COLL VENOUS BLD VENIPUNCTURE: CPT

## 2024-05-02 PROCEDURE — 82728 ASSAY OF FERRITIN: CPT

## 2024-05-06 DIAGNOSIS — D50.9 IRON DEFICIENCY ANEMIA, UNSPECIFIED IRON DEFICIENCY ANEMIA TYPE: Primary | ICD-10-CM

## 2024-05-06 RX ORDER — FERROUS SULFATE 325(65) MG
325 TABLET ORAL
Qty: 30 TABLET | Refills: 5 | Status: SHIPPED | OUTPATIENT
Start: 2024-05-06

## 2024-06-27 ENCOUNTER — LAB ENCOUNTER (OUTPATIENT)
Dept: LAB | Age: 64
End: 2024-06-27
Attending: FAMILY MEDICINE

## 2024-06-27 ENCOUNTER — OFFICE VISIT (OUTPATIENT)
Dept: FAMILY MEDICINE CLINIC | Facility: CLINIC | Age: 64
End: 2024-06-27

## 2024-06-27 VITALS
HEIGHT: 62.5 IN | WEIGHT: 193.81 LBS | RESPIRATION RATE: 18 BRPM | OXYGEN SATURATION: 96 % | BODY MASS INDEX: 34.77 KG/M2 | DIASTOLIC BLOOD PRESSURE: 56 MMHG | SYSTOLIC BLOOD PRESSURE: 110 MMHG | HEART RATE: 78 BPM

## 2024-06-27 DIAGNOSIS — I10 ESSENTIAL HYPERTENSION: ICD-10-CM

## 2024-06-27 DIAGNOSIS — E11.9 TYPE 2 DIABETES MELLITUS WITHOUT COMPLICATION, WITHOUT LONG-TERM CURRENT USE OF INSULIN (HCC): Primary | ICD-10-CM

## 2024-06-27 DIAGNOSIS — E11.9 TYPE 2 DIABETES MELLITUS WITHOUT COMPLICATION, WITHOUT LONG-TERM CURRENT USE OF INSULIN (HCC): ICD-10-CM

## 2024-06-27 DIAGNOSIS — R05.3 CHRONIC COUGH: ICD-10-CM

## 2024-06-27 PROBLEM — J44.89 ASTHMA WITH COPD (CHRONIC OBSTRUCTIVE PULMONARY DISEASE) (HCC): Chronic | Status: RESOLVED | Noted: 2024-02-29 | Resolved: 2024-06-27

## 2024-06-27 LAB
EST. AVERAGE GLUCOSE BLD GHB EST-MCNC: 177 MG/DL (ref 68–126)
HBA1C MFR BLD: 7.8 % (ref ?–5.7)

## 2024-06-27 PROCEDURE — 3051F HG A1C>EQUAL 7.0%<8.0%: CPT | Performed by: FAMILY MEDICINE

## 2024-06-27 PROCEDURE — 36415 COLL VENOUS BLD VENIPUNCTURE: CPT

## 2024-06-27 PROCEDURE — 3078F DIAST BP <80 MM HG: CPT | Performed by: FAMILY MEDICINE

## 2024-06-27 PROCEDURE — 99214 OFFICE O/P EST MOD 30 MIN: CPT | Performed by: FAMILY MEDICINE

## 2024-06-27 PROCEDURE — 3008F BODY MASS INDEX DOCD: CPT | Performed by: FAMILY MEDICINE

## 2024-06-27 PROCEDURE — 83036 HEMOGLOBIN GLYCOSYLATED A1C: CPT

## 2024-06-27 PROCEDURE — 3074F SYST BP LT 130 MM HG: CPT | Performed by: FAMILY MEDICINE

## 2024-06-27 RX ORDER — ORAL SEMAGLUTIDE 7 MG/1
1 TABLET ORAL DAILY
Qty: 30 TABLET | Refills: 2 | Status: SHIPPED | OUTPATIENT
Start: 2024-07-27 | End: 2024-08-26

## 2024-06-27 RX ORDER — ORAL SEMAGLUTIDE 3 MG/1
1 TABLET ORAL DAILY
Qty: 30 TABLET | Refills: 0 | Status: SHIPPED | OUTPATIENT
Start: 2024-06-27 | End: 2024-07-27

## 2024-06-27 NOTE — PATIENT INSTRUCTIONS
Start rybelsus 3mg daily. After completing 30 day prescription, start rybelsus 7mg daily  Start taking advair daily as directed  Complete CT scan of chest  Follow up with pulmonologist.   Complete blood work before next appointment.     Video "[employer]  What is Type 2 Diabetes?  Watch this clip to understand what happens within your body when you have type 2 diabetes, and the importance of keeping your blood glucose levels within a healthy range.  To watch the video:  Scan the QR code  Using your mobile device, scan the following code:  OR  Go to the website:  [employer]  Enter the prescription code:  1576E    © 8684-5028 [employer]. All rights reserved. This information is not intended as a substitute for professional medical care. Always follow your healthcare professional's instructions.    Managing Type 2 Diabetes  Type 2 diabetes is a long-term (chronic) condition. Managing diabetes may mean making some tough changes. Yourhealthcare team can help you.  To manage type 2 diabetes, you'll need to balance your medicine with diet and activity. You will also need check your blood sugar. And work with yourhealthcare provider to prevent complications.    Take your medicine  You may take pills or give yourself insulin shots for diabetes. Or you may use both. Take your medicines or give yourself insulin at the right times to help you control your blood sugar. Think about ways that will help you remember to take your medicines the right way every day. Ask your healthcare provider orteam for ideas.  You may only take pills for your diabetes. But this may change. Over time, mostpeople with type 2 diabetes also need insulin.  Eat healthy  A healthy diet helps control the amount of sugar in your blood. It also helps you stay at a healthy weight. Or it helps you lose weight, if if you'reoverweight. Extra weight makes it harder to control diabetes.  Your healthcare team will help you create  a plan that works for you. You don'thave to give up all the foods you like. Have meals and snacks with:  Vegetables  Fruits  Lean meats or other healthy proteins  Whole grains  Low- or nonfat dairy products     Be physically active  Being active helps lower your blood sugar. Activity helps your body use insulinto turn food into energy. It also helps you manage your weight:  Ask your healthcare provider to help you to make an activity program that's right for you. Your program is based on your age, general health, and types of activity you enjoy. Start slow. But aim for at least 150 minutes of exercise or activity each week. Start with 30 minutes a day. Exercise in 10-minute blocks. Don’t let more than 2 days go by without being active.  Check your blood sugar  Checking your own blood sugar may be a regular part of your care. Or you may only need to check your blood sugar from time to time. Your healthcare provider will tell you how to check your blood sugar at home. Checking it tells you if your blood sugar is in your target range. Having your blood sugars within thetarget range means that you are managing your diabetes well.  If your blood sugar levels are too high or too low, your healthcare provider may suggest changes to your diet or activity level. He or she may also adjustyour medicine.  Your healthcare provider may also tell you to check your blood sugar more oftenwhen you are sick.  Take care of yourself  When you have diabetes, you may be more likely to get other health problems. They include foot, eye, heart, nerve, and kidney problems. You can help prevent these problems by controlling your blood sugar and taking good care of yourself. Your healthcare provider, nurse, diabetes educator, and others canhelp you with the following:  Checkups. You should have regular checkups with your healthcare provider. At those visits, you will have a physical exam that includes checking your feet. Your healthcare  provider will also check your blood pressure and weight. Take your shoes off before your appointment starts to be sure your feet are checked.  Other exams. You'll also need eye, foot, and dental exams at least once each year.  Lab tests. You will have blood and urine tests:  Your healthcare provider will check your hemoglobin A1C at least twice a year. This blood test shows how well you have been controlling your blood sugar over 2 to 3 months. The results help your healthcare provider manage your diabetes.    You will also have other lab tests. For example, to check for kidney problems and abnormal cholesterol levels.  Smoking. If you smoke, you will need to quit. Smoking makes it more likely to get complications from diabetes. Ask your healthcare provider about ways to quit. Also don't use e-cigarette, or vaping, products.  Vaccines. Get a yearly flu shot. And ask your healthcare provider about vaccines to prevent pneumonia, shingles, and hepatitis B.  Stress and depression  Most people have challenges throughout their lives. Living with diabetes can increase your stress. Feeling stressed or depressed can actually affect yourblood sugar levels.  Tell your healthcare provider if you are having trouble coping with diabetes.He or she can help or refer you to other providers or programs.  To learn more  Know where you can get help. You can try the following:  Support. Ask family and friends to support your efforts to take care of yourself. Or look for a diabetes support group nearby or on the internet. Check the Connect with Others link on www.diabetes.org.  Counseling. Talk with a , psychologist, psychiatrist, or other counselor.  Information. Contact the American Diabetes Association at 190-332-5244 or www.diabetes.org  [employer] last reviewed this educational content on11/1/2019    © 4362-4283 [employer]. All rights reserved. This information is not intended as a substitute for  professional medical care. Always follow yourhealthcare professional's instructions.    Type 2 Diabetes and Food Choices  You make food choices every day. Whole wheat or white bread? A side of French fries or fresh fruit? Eat now or later? Choices about what, when, and how much you eat affect your blood sugar (glucose), and also your blood pressure and cholesterol. Understanding how food affects blood glucose is the first step in managing diabetes. And following a diabetesmeal plan can help you keep your blood glucose levels on track.   Prevent problems  Having type 2 diabetes means that your body doesn’t control blood glucose well. When blood glucose stays too high for too long, serious health problems can develop. It's important to control your blood glucose through diet, exercise, and medicine. This can delay or prevent kidney,eye, nerve, and heart disease, and other complications of diabetes.   Control carbohydrates  Carbohydrates are foods that have the biggest effect on your blood glucose levels. After you eat carbohydrates, your blood glucose rises. Fruit, sweet foods and drinks, starchy foods (such as bread, potatoes, and rice), and milk and milk products contain carbohydrates. Carbohydrates are important for health. But when you eat too many at once, your blood glucose can go too high. This is even more likely if you don't have or take enough insulin forthat food.   Some carbohydrates may raise blood glucose more than others. These include potatoes, sweets, and white bread. Better choices are less processed foods with more fiber and nutrients. Good options are 100% whole-wheat bread, oatmeal,brown rice, and nonstarchy vegetables.   Learn to use food labels that show added sugar. And try to find healthierchoices, particularly if you are overweight.    Food and medicine  Insulin helps glucose move from the blood into your muscle cells, where it can be used for energy. Some diabetes medicines that are taken by  mouth help you make more insulin. Or they help your insulin work more efficiently. So your medicines and food plan have to work together. If you take insulin shots, you need to be very careful to match the amount of carbohydrates you eat with your insulin dose. If you have too many carbohydrates without adjusting your insulin dose, your blood glucose might become too high. If you have too few carbohydrates, your blood glucose might be too low. Your healthcare provider ora dietitian can help you match your food choices to your medicine.   Have a meal plan  With certain medicines, it's best to eat the same amount of food at the same time every day. That keeps your glucose levels stable. And it helps your medicine work best. Physical activity is an important way to control blood glucose, too. Try to exercise at the same time every day. That way you can build the extra calories you need for exercise into your meal plan. With othermedicines, you may have more choices about how much you eat and when.   If you want to change your medicine to better fit your lifestyle, talk withyour healthcare provider.   Eat smart  You can eat the same foods as everyone else, but you have to carefully watch for certain details. That’s where your diabetes meal plan comes in. A personal meal plan tells you the time of day to eat meals and snacks, the types of food to eat, and how much. Itshould include your favorite foods. And it should focus on these healthy foods:   Whole grains, such as 100% whole-wheat bread, brown rice, and oatmeal  Nonfat or low-fat dairy products, such as nonfat milk and yogurt (but be sure these products don't have sugar added to make up for the fat removed)  Lean meats, poultry, fish, eggs, and dried beans and peas  Foods and drinks with no added sugar  Fruits and vegetables  At first, it may be helpful to use measuring cups and spoons to make sure you’re really eating the amount of food that’s in your plan. You can  also use standardized portion sizes on food labels, such as 1 serving of meat being the size of a deck of cards. By checking your blood glucose 1 to 2 hours after eating, you can learn how your food choicesaffect your blood glucose.   To create a diabetes meal plan or change a plan that’s not working for you, see a dietitian or diabetes educator. Let them know if you have any new health concerns or if your medicines have changed. Having ameal plan that you can live with will keep you at your healthy best.     © 8341-9339 [employer]. All rights reserved. This information is not intended as a substitute for professional medical care. Always follow yourhealthcare professional's instructions.    Diabetes: Caring for Your Body   When you have diabetes, your body needs special care. This care helps you stay healthy and prevent problems. Exercise and healthy eating are a part of this. You can also protect yourself by taking special care of your feet, skin, teeth,and eyes.   Caring for your feet     Follow these tips to help keep your feet healthy:  Check your feet every day for redness, blisters, cracks, dry skin, or numbness. Use a mirror to check the bottoms of your feet, if needed. Or ask for help.  Wash your feet in warm (not hot) water. Don’t soak them.  Use an emery board to keep your toenails even with the ends of your toes. File away sharp edges. A foot doctor (podiatrist) may need to cut your toenails for you.  Smooth down calluses gently or wait until your next podiatry appointment.  Keep your skin soft and smooth by putting a thin layer of skin lotion on the tops and bottoms of your feet. Don't put lotion in between your toes.  Always wear shoes or slippers, even inside your home. Make sure that shoes are correctly fitted, not too tight and not too loose. This can cause friction and rubbing of your feet. Change your socks daily. Always check shoes for foreign objects before putting them on.  Call  your healthcare provider right away if your feet are numb or painful. Also call your provider if a cut or sore doesn’t heal in a few days.  Preventing skin infections   To prevent skin infections, bathe every day, but use a moderate water temperature.. Dry yourself well, especially between your toes. Try to keep your home on the humid side during the colder months of the year to prevent your skin getting dry. Wash any cuts with warm, soapy water. Cover with a sterile bandage. Call your healthcare provider if a cut or sore doesn't heal in a fewdays, feels warm, itches, is swollen, or has a bad smell.   Caring for your teeth   Follow these guidelines for healthy teeth:  Brush your teeth twice daily.  Floss your teeth daily.  See your dentist at least twice yearly.  Keep your blood sugar in a good range.  Caring for your eyes  Have your eyes checked every year by an optometrist or ophthalmologist. Letyour healthcare provider know if you experience any of the following symptoms:   Blurred vision  Dark spots or \"holes\"  Flashes of light  Seeing more floaters than usual  Poor night vision  If you smoke, quit  Smoking is dangerous for everyone, especially people with diabetes. It can harm the blood vessels in your eyes, kidneys, nerves, and heart. It raises blood pressure. Smoking can also slow healing, so infections are more likely. Askyour healthcare provider about programs to help you stop smoking.   [employer] last reviewed this educational content on12/1/2021 © 2000-2022 [employer]. All rights reserved. This information is not intended as a substitute for professional medical care. Always follow yourhealthcare professional's instructions.

## 2024-06-27 NOTE — PROGRESS NOTES
[facility] Family Medicine Office Note  Chief Complaint:   Chief Complaint   Patient presents with    Follow - Up     Pt is coming in to F/U on DM and states that she has a cough        HPI:   This is a 64 year old female coming in for    T2DM - Last A1c value was 7.9% done 2024. Has been compliant with metformin. No symptoms. Did recently complete DM eye exam.     Chronic cough - remains symptomatic. Does not use advair daily as prescribed. No new symptoms.     HTN - compliant with medication, no new symptoms.     Past Medical History:    Arthritis    Asthma (HCC)    Diabetes (HCC)    Hypertension     Past Surgical History:   Procedure Laterality Date    Colonoscopy  2023    Colonoscopy  2023    Hysterectomy            Tubal ligation  20years     Social History:  Social History     Socioeconomic History    Marital status:    Tobacco Use    Smoking status: Never    Smokeless tobacco: Never   Vaping Use    Vaping status: Never Used   Substance and Sexual Activity    Alcohol use: Never    Drug use: Never   Other Topics Concern    Caffeine Concern No    Exercise No    Seat Belt No    Special Diet No    Stress Concern No    Weight Concern No     Family History:  Family History   Problem Relation Age of Onset    Diabetes Mother     Other (circulation problems) Mother     Colon Cancer Father     Cancer Father      Allergies:  No Known Allergies  Current Meds:  Current Outpatient Medications   Medication Sig Dispense Refill    Semaglutide (RYBELSUS) 3 MG Oral Tab Take 1 tablet by mouth daily. 30 tablet 0    [START ON 2024] Semaglutide (RYBELSUS) 7 MG Oral Tab Take 1 tablet by mouth daily. 30 tablet 2    Ferrous Sulfate 325 (65 Fe) MG Oral Tab Take 1 tablet (325 mg total) by mouth daily with breakfast. 30 tablet 5    TURMERIC OR Take 1 tablet by mouth As Directed.      fluticasone-salmeterol (ADVAIR DISKUS) 100-50 MCG/ACT Inhalation Aerosol Powder, Breath Activated Inhale 1 puff  into the lungs 2 (two) times daily. 60 each 2    Continuous Blood Gluc Sensor (FREESTYLE BHAVIN 14 DAY SENSOR) Does not apply Misc 1 each every 14 (fourteen) days. 2 each 11    VENTOLIN  (90 Base) MCG/ACT Inhalation Aero Soln Inhale 2 puffs into the lungs every 4 (four) hours as needed for Wheezing or Shortness of Breath.      ascorbic acid 500 MG Oral Chew Tab Chew 1 tablet (500 mg total) by mouth daily.      atorvastatin 20 MG Oral Tab Take 1 tablet (20 mg total) by mouth daily.      Cholecalciferol 50 MCG (2000 UT) Oral Cap Take 2,000 Units by mouth daily.      diclofenac 1 % External Gel Apply 1 Application topically as needed.      fluticasone propionate 50 MCG/ACT Nasal Suspension 1 spray by Nasal route daily.      ketoconazole 2 % External Cream Apply 1 Application topically 2 (two) times daily.      Losartan Potassium-HCTZ 100-12.5 MG Oral Tab Take 1 tablet by mouth daily.      metFORMIN HCl 1000 MG Oral Tab Take 1 tablet (1,000 mg total) by mouth in the morning and 1 tablet (1,000 mg total) before bedtime.      Multiple Vitamins-Minerals (ONE-A-DAY WOMENS 50 PLUS) Oral Tab Take 1 tablet by mouth daily.      Omega-3 Fatty Acids (FISH OIL) 1200 MG Oral Capsule Delayed Release Take 1 tablet by mouth daily.      triamcinolone 0.1 % External Ointment Apply 1 Application topically 2 (two) times daily.      Accu-Chek Softclix Lancets Does not apply Misc Use as directed to measure blood sugar once daily. 100 each 3    Blood Glucose Monitoring Suppl (ACCU-CHEK GUIDE ME) w/Device Does not apply Kit 1 each daily. Use as directed to measure blood sugar once daily. 1 kit 0    Glucose Blood (ACCU-CHEK GUIDE) In Vitro Strip Use as directed to measure blood sugar once daily. 100 strip 3    benzonatate 200 MG Oral Cap Take 1 capsule (200 mg total) by mouth 3 (three) times daily as needed for cough. 30 capsule 0      Counseling given: Not Answered       REVIEW OF SYSTEMS:   Review of Systems   Constitutional: Negative.     HENT: Negative.     Eyes: Negative.    Respiratory:  Positive for cough.    Cardiovascular: Negative.    Gastrointestinal: Negative.    Endocrine: Negative.    Genitourinary: Negative.    Musculoskeletal: Negative.    Skin: Negative.    Neurological: Negative.    Psychiatric/Behavioral: Negative.          EXAM:   /56   Pulse 78   Resp 18   Ht 5' 2.5\" (1.588 m)   Wt 193 lb 12.8 oz (87.9 kg)   LMP  (LMP Unknown)   SpO2 96%   BMI 34.88 kg/m²  Estimated body mass index is 34.88 kg/m² as calculated from the following:    Height as of this encounter: 5' 2.5\" (1.588 m).    Weight as of this encounter: 193 lb 12.8 oz (87.9 kg).   Vital signs reviewed.Appears stated age, well groomed.  Physical Exam  Vitals and nursing note reviewed.   Constitutional:       Appearance: Normal appearance. She is obese.   HENT:      Head: Normocephalic and atraumatic.   Eyes:      Pupils: Pupils are equal, round, and reactive to light.   Cardiovascular:      Rate and Rhythm: Normal rate and regular rhythm.      Pulses: Normal pulses.      Heart sounds: Normal heart sounds. No murmur heard.  Pulmonary:      Effort: Pulmonary effort is normal. No respiratory distress.      Breath sounds: Normal breath sounds. No wheezing or rhonchi.   Abdominal:      General: Abdomen is flat. Bowel sounds are normal. There is no distension.      Palpations: Abdomen is soft. There is no mass.      Tenderness: There is no abdominal tenderness.      Hernia: No hernia is present.   Musculoskeletal:      Right lower leg: No edema.      Left lower leg: No edema.   Skin:     Findings: No rash.   Neurological:      General: No focal deficit present.      Mental Status: She is alert and oriented to person, place, and time.          ASSESSMENT AND PLAN:   1. Type 2 diabetes mellitus without complication, without long-term current use of insulin (HCC)  Improving but needs better control. Add on rybelsus - no contraindications; no personal or FHX of MEN,  thyroid ca, pancreatitis. Reviewed medication administration, SE.     Goal: A1c < 7   Medications: metformin, rybelsus   Diet & exercise: diabetic diet, regular exericse.   Daily accucheks are appropriate  Hypoglycemic episodes?: none   Labs: UTD  CV: continue statin.   Renal: UTD  Eye: need records but UTD  Foot: UTD  Immunizations: UTD    RTC in 3mo for T2DM follow up.     - Semaglutide (RYBELSUS) 3 MG Oral Tab; Take 1 tablet by mouth daily.  Dispense: 30 tablet; Refill: 0  - Semaglutide (RYBELSUS) 7 MG Oral Tab; Take 1 tablet by mouth daily.  Dispense: 30 tablet; Refill: 2  - Hemoglobin A1C [E]; Future    2. Chronic cough  Advised on daily advair. Would recommend further evaluation w/ imaging as well as pulmonology referral.   - Pulmonary Referral - In Network  - CT CHEST (CPT=71250); Future    3. Essential hypertension  Stable, CPM.       Meds & Refills for this Visit:  Requested Prescriptions     Signed Prescriptions Disp Refills    Semaglutide (RYBELSUS) 3 MG Oral Tab 30 tablet 0     Sig: Take 1 tablet by mouth daily.    Semaglutide (RYBELSUS) 7 MG Oral Tab 30 tablet 2     Sig: Take 1 tablet by mouth daily.       Health Maintenance:  Health Maintenance Due   Topic Date Due    Diabetes Care Dilated Eye Exam  Never done    COVID-19 Vaccine (5 - 2023-24 season) 09/01/2023    MA Annual Health Assessment  Never done    Annual Depression Screening  Never done    Fall Risk Screening (Annual)  Never done    Pap Smear  12/22/2024       Patient/Caregiver Education: Patient/Caregiver Education: There are no barriers to learning. Medical education done.   Outcome: Patient verbalizes understanding. Patient is notified to call with any questions, complications, allergies, or worsening or changing symptoms.  Patient is to call with any side effects or complications from the treatments as a result of today.     Problem List:  Patient Active Problem List   Diagnosis    Mild intermittent asthma without complication (HCC)     Hyperlipidemia    Essential hypertension    Type 2 diabetes mellitus without complication, without long-term current use of insulin (HCC)

## 2024-06-28 ENCOUNTER — TELEPHONE (OUTPATIENT)
Dept: FAMILY MEDICINE CLINIC | Facility: CLINIC | Age: 64
End: 2024-06-28

## 2024-07-01 ENCOUNTER — MED REC SCAN ONLY (OUTPATIENT)
Dept: FAMILY MEDICINE CLINIC | Facility: CLINIC | Age: 64
End: 2024-07-01

## 2024-07-23 ENCOUNTER — HOSPITAL ENCOUNTER (OUTPATIENT)
Dept: CT IMAGING | Age: 64
Discharge: HOME | End: 2024-07-23
Attending: FAMILY MEDICINE
Payer: MEDICARE

## 2024-07-23 DIAGNOSIS — R05.3 CHRONIC COUGH: ICD-10-CM

## 2024-07-23 PROCEDURE — 71250 CT THORAX DX C-: CPT | Performed by: FAMILY MEDICINE

## 2024-07-24 DIAGNOSIS — I25.10 CORONARY ARTERY CALCIFICATION: ICD-10-CM

## 2024-07-24 DIAGNOSIS — R93.89 ABNORMAL SCREENING CT OF CHEST: Primary | ICD-10-CM

## 2024-07-24 RX ORDER — OMEPRAZOLE 40 MG/1
40 CAPSULE, DELAYED RELEASE ORAL DAILY
Qty: 30 CAPSULE | Refills: 0 | Status: SHIPPED | OUTPATIENT
Start: 2024-07-24 | End: 2025-07-19

## 2024-07-25 RX ORDER — OMEPRAZOLE 40 MG/1
40 CAPSULE, DELAYED RELEASE ORAL DAILY
Qty: 90 CAPSULE | Refills: 0 | OUTPATIENT
Start: 2024-07-25

## 2024-08-14 ENCOUNTER — TELEMEDICINE (OUTPATIENT)
Facility: CLINIC | Age: 64
End: 2024-08-14
Payer: COMMERCIAL

## 2024-08-14 DIAGNOSIS — R91.1 SOLID NODULE OF LUNG 6 MM TO 8 MM IN DIAMETER: Primary | ICD-10-CM

## 2024-08-14 DIAGNOSIS — R05.3 CHRONIC COUGH: ICD-10-CM

## 2024-08-14 PROBLEM — J44.89 ASTHMA WITH COPD (CHRONIC OBSTRUCTIVE PULMONARY DISEASE) (HCC): Chronic | Status: ACTIVE | Noted: 2024-08-14

## 2024-08-14 PROCEDURE — 99204 OFFICE O/P NEW MOD 45 MIN: CPT | Performed by: INTERNAL MEDICINE

## 2024-08-14 RX ORDER — BUDESONIDE 0.5 MG/2ML
0.5 INHALANT ORAL DAILY
Qty: 120 ML | Refills: 5 | Status: SHIPPED | OUTPATIENT
Start: 2024-08-14

## 2024-08-14 NOTE — PROGRESS NOTES
[facility] General Pulmonary Consult Note    Chief Complaint:  Chief Complaint   Patient presents with    Cough       History of Present Illness:  Susan Will is a 64 year old female who presents today for evaluation of chronic cough and lung nodule.  Patient is a never smoker.  Has 6 mm GGO.  No previous history of  lung disease or lung cancer.  No previous Cts - patient has a chronc cough but aside from that no other symptoms.      Patient has had chronic cough since November, it is overall better since happening but still has dry cough.  No particular pattern, does improve with advair.        Past Medical History:   Past Medical History:    Arthritis    Asthma (HCC)    Diabetes (HCC)    Hypertension        Past Surgical History:   Past Surgical History:   Procedure Laterality Date    Colonoscopy  2023    Colonoscopy  2023    Hysterectomy            Tubal ligation  20years       Family Medical History:   Family History   Problem Relation Age of Onset    Diabetes Mother     Other (circulation problems) Mother     Colon Cancer Father     Cancer Father         Social History:   Social History     Socioeconomic History    Marital status:      Spouse name: Not on file    Number of children: Not on file    Years of education: Not on file    Highest education level: Not on file   Occupational History    Not on file   Tobacco Use    Smoking status: Never    Smokeless tobacco: Never   Vaping Use    Vaping status: Never Used   Substance and Sexual Activity    Alcohol use: Never    Drug use: Never    Sexual activity: Not on file   Other Topics Concern    Caffeine Concern No    Exercise No    Seat Belt No    Special Diet No    Stress Concern No    Weight Concern No   Social History Narrative    Not on file     Social Determinants of Health     Financial Resource Strain: Not on file   Food Insecurity: Not on file   Transportation Needs: Not on file   Physical Activity: Not on file   Stress: Not on file    Social Connections: Not on file   Housing Stability: Not on file        Allergies: Patient has no known allergies.     Medications:   Current Outpatient Medications   Medication Sig Dispense Refill    budesonide 0.5 MG/2ML Inhalation Suspension Take 2 mL (0.5 mg total) by nebulization daily. 120 mL 5    Respiratory Therapy Supplies (FULL KIT NEBULIZER SET) Does not apply Misc 1 kit As Directed. 1 each 0    Omeprazole 40 MG Oral Capsule Delayed Release Take 1 capsule (40 mg total) by mouth daily. 30 capsule 0    Semaglutide (RYBELSUS) 7 MG Oral Tab Take 1 tablet by mouth daily. 30 tablet 2    Ferrous Sulfate 325 (65 Fe) MG Oral Tab Take 1 tablet (325 mg total) by mouth daily with breakfast. 30 tablet 5    TURMERIC OR Take 1 tablet by mouth As Directed.      fluticasone-salmeterol (ADVAIR DISKUS) 100-50 MCG/ACT Inhalation Aerosol Powder, Breath Activated Inhale 1 puff into the lungs 2 (two) times daily. 60 each 2    Continuous Blood Gluc Sensor (FREESTYLE BHAVIN 14 DAY SENSOR) Does not apply Misc 1 each every 14 (fourteen) days. 2 each 11    VENTOLIN  (90 Base) MCG/ACT Inhalation Aero Soln Inhale 2 puffs into the lungs every 4 (four) hours as needed for Wheezing or Shortness of Breath.      ascorbic acid 500 MG Oral Chew Tab Chew 1 tablet (500 mg total) by mouth daily.      atorvastatin 20 MG Oral Tab Take 1 tablet (20 mg total) by mouth daily.      Cholecalciferol 50 MCG (2000 UT) Oral Cap Take 2,000 Units by mouth daily.      diclofenac 1 % External Gel Apply 1 Application topically as needed.      fluticasone propionate 50 MCG/ACT Nasal Suspension 1 spray by Nasal route daily.      ketoconazole 2 % External Cream Apply 1 Application topically 2 (two) times daily.      Losartan Potassium-HCTZ 100-12.5 MG Oral Tab Take 1 tablet by mouth daily.      metFORMIN HCl 1000 MG Oral Tab Take 1 tablet (1,000 mg total) by mouth in the morning and 1 tablet (1,000 mg total) before bedtime.      Multiple Vitamins-Minerals  (ONE-A-DAY WOMENS 50 PLUS) Oral Tab Take 1 tablet by mouth daily.      Omega-3 Fatty Acids (FISH OIL) 1200 MG Oral Capsule Delayed Release Take 1 tablet by mouth daily.      triamcinolone 0.1 % External Ointment Apply 1 Application topically 2 (two) times daily.      Accu-Chek Softclix Lancets Does not apply Misc Use as directed to measure blood sugar once daily. 100 each 3    Blood Glucose Monitoring Suppl (ACCU-CHEK GUIDE ME) w/Device Does not apply Kit 1 each daily. Use as directed to measure blood sugar once daily. 1 kit 0    Glucose Blood (ACCU-CHEK GUIDE) In Vitro Strip Use as directed to measure blood sugar once daily. 100 strip 3    benzonatate 200 MG Oral Cap Take 1 capsule (200 mg total) by mouth 3 (three) times daily as needed for cough. 30 capsule 0       Review of Systems: Review of Systems    Physical Exam:  LMP  (LMP Unknown)      Physcial exam deferred due to video visit    Results:  Personally reviewed  WBC: 3.7, done on 2/5/2024.  HGB: 11.3, done on 2/5/2024.  PLT: 294, done on 2/5/2024.     Glucose: 181, done on 2/5/2024.  Cr: 0.73, done on 2/5/2024.  Last eGFR was 91 on 2/5/2024.  CA: 9.5, done on 2/5/2024.  Na: 140, done on 2/5/2024.  K: 4.2, done on 2/5/2024.  Cl: 107, done on 2/5/2024.  CO2: 28, done on 2/5/2024.  Last ALB was 3.6% done on 2/5/2024.     CT CHEST (CPT=71250)    Result Date: 7/23/2024  CONCLUSION:  1. Left upper lobe 6 mm ground-glass nodular density is nonspecific as detailed above.  A short-term follow-up CT of the chest can be performed for further evaluation and to ensure stability and/or resolution as clinically indicated.  LOCATION:  [facility] [place]   Dictated by (CST): Chanda Sanchez MD on 7/23/2024 at 9:09 AM     Finalized by (CST): Chanda Sanchez MD on 7/23/2024 at 9:17 AM         Assessment/Plan:  #1. 6 mm GGO, patient is low risk but nodule does not have all beningn features (is not completely round and beningn appearing)  Discussed need or fu CT in 6 months per guidelines    #2.  Chronic cough  Trial of budesonide nebs in addition to advair  If no improvement would try navage or neilmed for sinusitis    Return in about 5 months (around 1/14/2025).    Varinder Rucker MD  8/14/2024

## 2024-09-24 RX ORDER — PEAK FLOW METER
1 EACH MISCELLANEOUS AS DIRECTED
COMMUNITY
Start: 2024-09-03

## 2024-09-24 RX ORDER — OMEPRAZOLE 40 MG/1
40 CAPSULE, DELAYED RELEASE ORAL DAILY
Qty: 30 CAPSULE | Refills: 0 | Status: SHIPPED | OUTPATIENT
Start: 2024-09-24

## 2024-09-27 ENCOUNTER — LAB ENCOUNTER (OUTPATIENT)
Dept: LAB | Age: 64
End: 2024-09-27
Attending: FAMILY MEDICINE
Payer: MEDICARE

## 2024-09-27 ENCOUNTER — TELEPHONE (OUTPATIENT)
Dept: ORTHOPEDICS CLINIC | Facility: CLINIC | Age: 64
End: 2024-09-27

## 2024-09-27 ENCOUNTER — OFFICE VISIT (OUTPATIENT)
Dept: FAMILY MEDICINE CLINIC | Facility: CLINIC | Age: 64
End: 2024-09-27
Payer: COMMERCIAL

## 2024-09-27 VITALS
HEART RATE: 76 BPM | HEIGHT: 64 IN | DIASTOLIC BLOOD PRESSURE: 70 MMHG | SYSTOLIC BLOOD PRESSURE: 110 MMHG | OXYGEN SATURATION: 98 % | WEIGHT: 190 LBS | BODY MASS INDEX: 32.44 KG/M2

## 2024-09-27 DIAGNOSIS — M79.642 BILATERAL HAND PAIN: Primary | ICD-10-CM

## 2024-09-27 DIAGNOSIS — E11.9 TYPE 2 DIABETES MELLITUS WITHOUT COMPLICATION, WITHOUT LONG-TERM CURRENT USE OF INSULIN (HCC): Primary | ICD-10-CM

## 2024-09-27 DIAGNOSIS — G56.00 CARPAL TUNNEL SYNDROME, UNSPECIFIED LATERALITY: ICD-10-CM

## 2024-09-27 DIAGNOSIS — E11.9 TYPE 2 DIABETES MELLITUS WITHOUT COMPLICATION, WITHOUT LONG-TERM CURRENT USE OF INSULIN (HCC): ICD-10-CM

## 2024-09-27 DIAGNOSIS — M79.641 BILATERAL HAND PAIN: Primary | ICD-10-CM

## 2024-09-27 DIAGNOSIS — R05.3 CHRONIC COUGH: ICD-10-CM

## 2024-09-27 DIAGNOSIS — I10 ESSENTIAL HYPERTENSION: ICD-10-CM

## 2024-09-27 PROBLEM — J44.89 ASTHMA WITH COPD (CHRONIC OBSTRUCTIVE PULMONARY DISEASE) (HCC): Chronic | Status: RESOLVED | Noted: 2024-08-14 | Resolved: 2024-09-27

## 2024-09-27 PROBLEM — J45.40 MODERATE PERSISTENT ASTHMA WITHOUT COMPLICATION (HCC): Status: ACTIVE | Noted: 2024-01-31

## 2024-09-27 LAB
ALBUMIN SERPL-MCNC: 4.8 G/DL (ref 3.2–4.8)
ALBUMIN/GLOB SERPL: 2.1 {RATIO} (ref 1–2)
ALP LIVER SERPL-CCNC: 71 U/L
ALT SERPL-CCNC: 16 U/L
ANION GAP SERPL CALC-SCNC: 7 MMOL/L (ref 0–18)
AST SERPL-CCNC: 17 U/L (ref ?–34)
BILIRUB SERPL-MCNC: 0.5 MG/DL (ref 0.2–1.1)
BUN BLD-MCNC: 8 MG/DL (ref 9–23)
CALCIUM BLD-MCNC: 10.2 MG/DL (ref 8.7–10.4)
CHLORIDE SERPL-SCNC: 104 MMOL/L (ref 98–112)
CHOLEST SERPL-MCNC: 138 MG/DL (ref ?–200)
CO2 SERPL-SCNC: 30 MMOL/L (ref 21–32)
CREAT BLD-MCNC: 0.83 MG/DL
EGFRCR SERPLBLD CKD-EPI 2021: 78 ML/MIN/1.73M2 (ref 60–?)
EST. AVERAGE GLUCOSE BLD GHB EST-MCNC: 146 MG/DL (ref 68–126)
FASTING PATIENT LIPID ANSWER: YES
FASTING STATUS PATIENT QL REPORTED: YES
GLOBULIN PLAS-MCNC: 2.3 G/DL (ref 2–3.5)
GLUCOSE BLD-MCNC: 124 MG/DL (ref 70–99)
HBA1C MFR BLD: 6.7 % (ref ?–5.7)
HDLC SERPL-MCNC: 81 MG/DL (ref 40–59)
LDLC SERPL CALC-MCNC: 45 MG/DL (ref ?–100)
NONHDLC SERPL-MCNC: 57 MG/DL (ref ?–130)
OSMOLALITY SERPL CALC.SUM OF ELEC: 292 MOSM/KG (ref 275–295)
POTASSIUM SERPL-SCNC: 4.2 MMOL/L (ref 3.5–5.1)
PROT SERPL-MCNC: 7.1 G/DL (ref 5.7–8.2)
SODIUM SERPL-SCNC: 141 MMOL/L (ref 136–145)
TRIGL SERPL-MCNC: 56 MG/DL (ref 30–149)
VLDLC SERPL CALC-MCNC: 8 MG/DL (ref 0–30)

## 2024-09-27 PROCEDURE — 3074F SYST BP LT 130 MM HG: CPT | Performed by: FAMILY MEDICINE

## 2024-09-27 PROCEDURE — 36415 COLL VENOUS BLD VENIPUNCTURE: CPT

## 2024-09-27 PROCEDURE — 3078F DIAST BP <80 MM HG: CPT | Performed by: FAMILY MEDICINE

## 2024-09-27 PROCEDURE — 80053 COMPREHEN METABOLIC PANEL: CPT

## 2024-09-27 PROCEDURE — 3008F BODY MASS INDEX DOCD: CPT | Performed by: FAMILY MEDICINE

## 2024-09-27 PROCEDURE — 83036 HEMOGLOBIN GLYCOSYLATED A1C: CPT

## 2024-09-27 PROCEDURE — 99214 OFFICE O/P EST MOD 30 MIN: CPT | Performed by: FAMILY MEDICINE

## 2024-09-27 PROCEDURE — 80061 LIPID PANEL: CPT

## 2024-09-27 RX ORDER — ORAL SEMAGLUTIDE 7 MG/1
1 TABLET ORAL DAILY
Qty: 90 TABLET | Refills: 1 | Status: SHIPPED | OUTPATIENT
Start: 2024-09-27 | End: 2024-10-27

## 2024-09-27 RX ORDER — FLASH GLUCOSE SENSOR
1 KIT MISCELLANEOUS
Qty: 2 EACH | Refills: 11 | Status: SHIPPED | OUTPATIENT
Start: 2024-09-27

## 2024-09-27 NOTE — PATIENT INSTRUCTIONS
[employer]™  What is Type 2 Diabetes?  Watch this clip to understand what happens within your body when you have type 2 diabetes, and the importance of keeping your blood glucose levels within a healthy range.  To watch the video:  Scan the QR code  Using your mobile device, scan the following code:  OR  Go to the website:  [employer]  Enter the prescription code:  [id number]    © 8632-1826 [employer]. All rights reserved. This information is not intended as a substitute for professional medical care. Always follow your healthcare professional's instructions.    Managing Type 2 Diabetes  Type 2 diabetes is a long-term (chronic) condition. Managing diabetes may mean making some tough changes. Yourhealthcare team can help you.  To manage type 2 diabetes, you'll need to balance your medicine with diet and activity. You will also need check your blood sugar. And work with yourhealthcare provider to prevent complications.    Take your medicine  You may take pills or give yourself insulin shots for diabetes. Or you may use both. Take your medicines or give yourself insulin at the right times to help you control your blood sugar. Think about ways that will help you remember to take your medicines the right way every day. Ask your healthcare provider orteam for ideas.  You may only take pills for your diabetes. But this may change. Over time, mostpeople with type 2 diabetes also need insulin.  Eat healthy  A healthy diet helps control the amount of sugar in your blood. It also helps you stay at a healthy weight. Or it helps you lose weight, if if you'reoverweight. Extra weight makes it harder to control diabetes.  Your healthcare team will help you create a plan that works for you. You don'thave to give up all the foods you like. Have meals and snacks with:  Vegetables  Fruits  Lean meats or other healthy proteins  Whole grains  Low- or nonfat dairy products     Be physically  active  Being active helps lower your blood sugar. Activity helps your body use insulinto turn food into energy. It also helps you manage your weight:  Ask your healthcare provider to help you to make an activity program that's right for you. Your program is based on your age, general health, and types of activity you enjoy. Start slow. But aim for at least 150 minutes of exercise or activity each week. Start with 30 minutes a day. Exercise in 10-minute blocks. Don’t let more than 2 days go by without being active.  Check your blood sugar  Checking your own blood sugar may be a regular part of your care. Or you may only need to check your blood sugar from time to time. Your healthcare provider will tell you how to check your blood sugar at home. Checking it tells you if your blood sugar is in your target range. Having your blood sugars within thetarget range means that you are managing your diabetes well.  If your blood sugar levels are too high or too low, your healthcare provider may suggest changes to your diet or activity level. He or she may also adjustyour medicine.  Your healthcare provider may also tell you to check your blood sugar more oftenwhen you are sick.  Take care of yourself  When you have diabetes, you may be more likely to get other health problems. They include foot, eye, heart, nerve, and kidney problems. You can help prevent these problems by controlling your blood sugar and taking good care of yourself. Your healthcare provider, nurse, diabetes educator, and others canhelp you with the following:  Checkups. You should have regular checkups with your healthcare provider. At those visits, you will have a physical exam that includes checking your feet. Your healthcare provider will also check your blood pressure and weight. Take your shoes off before your appointment starts to be sure your feet are checked.  Other exams. You'll also need eye, foot, and dental exams at least once each year.  Lab  tests. You will have blood and urine tests:  Your healthcare provider will check your hemoglobin A1C at least twice a year. This blood test shows how well you have been controlling your blood sugar over 2 to 3 months. The results help your healthcare provider manage your diabetes.    You will also have other lab tests. For example, to check for kidney problems and abnormal cholesterol levels.  Smoking. If you smoke, you will need to quit. Smoking makes it more likely to get complications from diabetes. Ask your healthcare provider about ways to quit. Also don't use e-cigarette, or vaping, products.  Vaccines. Get a yearly flu shot. And ask your healthcare provider about vaccines to prevent pneumonia, shingles, and hepatitis B.  Stress and depression  Most people have challenges throughout their lives. Living with diabetes can increase your stress. Feeling stressed or depressed can actually affect yourblood sugar levels.  Tell your healthcare provider if you are having trouble coping with diabetes.He or she can help or refer you to other providers or programs.  To learn more  Know where you can get help. You can try the following:  Support. Ask family and friends to support your efforts to take care of yourself. Or look for a diabetes support group nearby or on the internet. Check the Connect with Others link on www.diabetes.org.  Counseling. Talk with a , psychologist, psychiatrist, or other counselor.  Information. Contact the American Diabetes Association at 023-591-8270 or www.diabetes.org  [employer] last reviewed this educational content on11/1/2019    © 8673-8774 [employer]. All rights reserved. This information is not intended as a substitute for professional medical care. Always follow yourhealthcare professional's instructions.    Type 2 Diabetes and Food Choices  You make food choices every day. Whole wheat or white bread? A side of French fries or fresh fruit? Eat now or later?  Choices about what, when, and how much you eat affect your blood sugar (glucose), and also your blood pressure and cholesterol. Understanding how food affects blood glucose is the first step in managing diabetes. And following a diabetesmeal plan can help you keep your blood glucose levels on track.   Prevent problems  Having type 2 diabetes means that your body doesn’t control blood glucose well. When blood glucose stays too high for too long, serious health problems can develop. It's important to control your blood glucose through diet, exercise, and medicine. This can delay or prevent kidney,eye, nerve, and heart disease, and other complications of diabetes.   Control carbohydrates  Carbohydrates are foods that have the biggest effect on your blood glucose levels. After you eat carbohydrates, your blood glucose rises. Fruit, sweet foods and drinks, starchy foods (such as bread, potatoes, and rice), and milk and milk products contain carbohydrates. Carbohydrates are important for health. But when you eat too many at once, your blood glucose can go too high. This is even more likely if you don't have or take enough insulin forthat food.   Some carbohydrates may raise blood glucose more than others. These include potatoes, sweets, and white bread. Better choices are less processed foods with more fiber and nutrients. Good options are 100% whole-wheat bread, oatmeal,brown rice, and nonstarchy vegetables.   Learn to use food labels that show added sugar. And try to find healthierchoices, particularly if you are overweight.    Food and medicine  Insulin helps glucose move from the blood into your muscle cells, where it can be used for energy. Some diabetes medicines that are taken by mouth help you make more insulin. Or they help your insulin work more efficiently. So your medicines and food plan have to work together. If you take insulin shots, you need to be very careful to match the amount of carbohydrates you eat  with your insulin dose. If you have too many carbohydrates without adjusting your insulin dose, your blood glucose might become too high. If you have too few carbohydrates, your blood glucose might be too low. Your healthcare provider ora dietitian can help you match your food choices to your medicine.   Have a meal plan  With certain medicines, it's best to eat the same amount of food at the same time every day. That keeps your glucose levels stable. And it helps your medicine work best. Physical activity is an important way to control blood glucose, too. Try to exercise at the same time every day. That way you can build the extra calories you need for exercise into your meal plan. With othermedicines, you may have more choices about how much you eat and when.   If you want to change your medicine to better fit your lifestyle, talk withyour healthcare provider.   Eat smart  You can eat the same foods as everyone else, but you have to carefully watch for certain details. That’s where your diabetes meal plan comes in. A personal meal plan tells you the time of day to eat meals and snacks, the types of food to eat, and how much. Itshould include your favorite foods. And it should focus on these healthy foods:   Whole grains, such as 100% whole-wheat bread, brown rice, and oatmeal  Nonfat or low-fat dairy products, such as nonfat milk and yogurt (but be sure these products don't have sugar added to make up for the fat removed)  Lean meats, poultry, fish, eggs, and dried beans and peas  Foods and drinks with no added sugar  Fruits and vegetables  At first, it may be helpful to use measuring cups and spoons to make sure you’re really eating the amount of food that’s in your plan. You can also use standardized portion sizes on food labels, such as 1 serving of meat being the size of a deck of cards. By checking your blood glucose 1 to 2 hours after eating, you can learn how your food choicesaffect your blood glucose.   To  create a diabetes meal plan or change a plan that’s not working for you, see a dietitian or diabetes educator. Let them know if you have any new health concerns or if your medicines have changed. Having ameal plan that you can live with will keep you at your healthy best.     © 3990-2198 [employer]. All rights reserved. This information is not intended as a substitute for professional medical care. Always follow yourhealthcare professional's instructions.    Diabetes: Caring for Your Body   When you have diabetes, your body needs special care. This care helps you stay healthy and prevent problems. Exercise and healthy eating are a part of this. You can also protect yourself by taking special care of your feet, skin, teeth,and eyes.   Caring for your feet     Follow these tips to help keep your feet healthy:  Check your feet every day for redness, blisters, cracks, dry skin, or numbness. Use a mirror to check the bottoms of your feet, if needed. Or ask for help.  Wash your feet in warm (not hot) water. Don’t soak them.  Use an emery board to keep your toenails even with the ends of your toes. File away sharp edges. A foot doctor (podiatrist) may need to cut your toenails for you.  Smooth down calluses gently or wait until your next podiatry appointment.  Keep your skin soft and smooth by putting a thin layer of skin lotion on the tops and bottoms of your feet. Don't put lotion in between your toes.  Always wear shoes or slippers, even inside your home. Make sure that shoes are correctly fitted, not too tight and not too loose. This can cause friction and rubbing of your feet. Change your socks daily. Always check shoes for foreign objects before putting them on.  Call your healthcare provider right away if your feet are numb or painful. Also call your provider if a cut or sore doesn’t heal in a few days.  Preventing skin infections   To prevent skin infections, bathe every day, but use a moderate water  temperature.. Dry yourself well, especially between your toes. Try to keep your home on the humid side during the colder months of the year to prevent your skin getting dry. Wash any cuts with warm, soapy water. Cover with a sterile bandage. Call your healthcare provider if a cut or sore doesn't heal in a fewdays, feels warm, itches, is swollen, or has a bad smell.   Caring for your teeth   Follow these guidelines for healthy teeth:  Brush your teeth twice daily.  Floss your teeth daily.  See your dentist at least twice yearly.  Keep your blood sugar in a good range.  Caring for your eyes  Have your eyes checked every year by an optometrist or ophthalmologist. Letyour healthcare provider know if you experience any of the following symptoms:   Blurred vision  Dark spots or \"holes\"  Flashes of light  Seeing more floaters than usual  Poor night vision  If you smoke, quit  Smoking is dangerous for everyone, especially people with diabetes. It can harm the blood vessels in your eyes, kidneys, nerves, and heart. It raises blood pressure. Smoking can also slow healing, so infections are more likely. Askyour healthcare provider about programs to help you stop smoking.   [employer] last reviewed this educational content on12/1/2021 © 2000-2022 [employer]. All rights reserved. This information is not intended as a substitute for professional medical care. Always follow yourhealthcare professional's instructions.

## 2024-09-27 NOTE — TELEPHONE ENCOUNTER
Patient is scheduled for ALCIDES hand pain. Please advise if imaging is needed.  Future Appointments   Date Time Provider Department Center   10/24/2024  7:30 AM PF CT RM1 PF CT [place]   11/22/2024 11:30 AM [id number]   1/14/2025  7:30 AM PF CT RM1 PF CT [place]

## 2024-09-27 NOTE — PROGRESS NOTES
[facility] Family Medicine Office Note  Chief Complaint:   Chief Complaint   Patient presents with    Follow - Up     Follow up on blood work   Also mentioned she wanted to talk about the diabetic patch on her arm        HPI:   This is a 64 year old female coming in for    DM follow up  -FBG 110s-120s  -No hypoglycemic episodes  -Last A1c value was 7.8% done 2024. Started on rybelsus at last appointment. Tolerating well w/o side effects. Has been compliant with metformin as well.    -No numbness in feet.   -Did have rash at arm in area of freestyle sensor. Reports sensor came off. She denies any prior allergy history. Rash has since resolved.     Does have known b/l CTS syndrome. Has had NCV 10 years ago. Has done PT in the past. Has been referred to ortho in the past but did not follow up d/t cost. Does wear wrist splints.     HTN   -Compliant with medications. Denies any associated symptoms.     Chronic cough  -Has followed up with pulmonologist. Trialed on budesonide nebs. Has also been compliant with advair. Feels better, cough occurs rarely now. Had CT chest which showed 6mm nodule, plan for repeat CT chest in 2025.     Past Medical History:    Arthritis    Asthma (HCC)    Diabetes (HCC)    Hypertension     Past Surgical History:   Procedure Laterality Date    Colonoscopy  2023    Colonoscopy  2023    Hysterectomy            Tubal ligation  20years     Social History:  Social History     Socioeconomic History    Marital status:    Tobacco Use    Smoking status: Never    Smokeless tobacco: Never   Vaping Use    Vaping status: Never Used   Substance and Sexual Activity    Alcohol use: Never    Drug use: Never   Other Topics Concern    Caffeine Concern No    Exercise No    Seat Belt No    Special Diet No    Stress Concern No    Weight Concern No     Family History:  Family History   Problem Relation Age of Onset    Diabetes Mother     Other (circulation problems) Mother      Colon Cancer Father     Cancer Father      Allergies:  No Known Allergies  Current Meds:  Current Outpatient Medications   Medication Sig Dispense Refill    Semaglutide (RYBELSUS) 7 MG Oral Tab Take 1 tablet by mouth daily. 90 tablet 1    OMEPRAZOLE 40 MG Oral Capsule Delayed Release TAKE 1 CAPSULE(40 MG) BY MOUTH DAILY 30 capsule 0    Nebulizers ([employer] AEROSOL DELIVERY SYSTEM) Does not apply Misc Take 1 Bottle by mouth As Directed.      budesonide 0.5 MG/2ML Inhalation Suspension Take 2 mL (0.5 mg total) by nebulization daily. 120 mL 5    Respiratory Therapy Supplies (FULL KIT NEBULIZER SET) Does not apply Misc 1 kit As Directed. 1 each 0    Ferrous Sulfate 325 (65 Fe) MG Oral Tab Take 1 tablet (325 mg total) by mouth daily with breakfast. 30 tablet 5    TURMERIC OR Take 1 tablet by mouth As Directed.      fluticasone-salmeterol (ADVAIR DISKUS) 100-50 MCG/ACT Inhalation Aerosol Powder, Breath Activated Inhale 1 puff into the lungs 2 (two) times daily. 60 each 2    Continuous Blood Gluc Sensor (FREESTYLE BHAVIN 14 DAY SENSOR) Does not apply Misc 1 each every 14 (fourteen) days. 2 each 11    VENTOLIN  (90 Base) MCG/ACT Inhalation Aero Soln Inhale 2 puffs into the lungs every 4 (four) hours as needed for Wheezing or Shortness of Breath.      ascorbic acid 500 MG Oral Chew Tab Chew 1 tablet (500 mg total) by mouth daily.      atorvastatin 20 MG Oral Tab Take 1 tablet (20 mg total) by mouth daily.      Cholecalciferol 50 MCG (2000 UT) Oral Cap Take 2,000 Units by mouth daily.      diclofenac 1 % External Gel Apply 1 Application topically as needed.      fluticasone propionate 50 MCG/ACT Nasal Suspension 1 spray by Nasal route daily.      ketoconazole 2 % External Cream Apply 1 Application topically 2 (two) times daily.      Losartan Potassium-HCTZ 100-12.5 MG Oral Tab Take 1 tablet by mouth daily.      metFORMIN HCl 1000 MG Oral Tab Take 1 tablet (1,000 mg total) by mouth in the morning and 1 tablet (1,000 mg  total) before bedtime.      Multiple Vitamins-Minerals (ONE-A-DAY WOMENS 50 PLUS) Oral Tab Take 1 tablet by mouth daily.      Omega-3 Fatty Acids (FISH OIL) 1200 MG Oral Capsule Delayed Release Take 1 tablet by mouth daily.      triamcinolone 0.1 % External Ointment Apply 1 Application topically 2 (two) times daily.      Accu-Chek Softclix Lancets Does not apply Misc Use as directed to measure blood sugar once daily. 100 each 3    Blood Glucose Monitoring Suppl (ACCU-CHEK GUIDE ME) w/Device Does not apply Kit 1 each daily. Use as directed to measure blood sugar once daily. 1 kit 0    Glucose Blood (ACCU-CHEK GUIDE) In Vitro Strip Use as directed to measure blood sugar once daily. 100 strip 3    benzonatate 200 MG Oral Cap Take 1 capsule (200 mg total) by mouth 3 (three) times daily as needed for cough. 30 capsule 0      Counseling given: Not Answered       REVIEW OF SYSTEMS:   Review of Systems   Constitutional: Negative.    HENT: Negative.     Eyes: Negative.    Respiratory: Negative.     Cardiovascular: Negative.    Gastrointestinal: Negative.    Endocrine: Negative.    Genitourinary: Negative.    Musculoskeletal: Negative.    Skin: Negative.    Neurological: Negative.    Psychiatric/Behavioral: Negative.          EXAM:   /70   Pulse 76   Ht 5' 4\" (1.626 m)   Wt 190 lb (86.2 kg)   LMP  (LMP Unknown)   SpO2 98%   BMI 32.61 kg/m²  Estimated body mass index is 32.61 kg/m² as calculated from the following:    Height as of this encounter: 5' 4\" (1.626 m).    Weight as of this encounter: 190 lb (86.2 kg).   Vital signs reviewed.Appears stated age, well groomed.  Physical Exam  Vitals and nursing note reviewed.   Constitutional:       Appearance: Normal appearance.   HENT:      Head: Normocephalic and atraumatic.   Cardiovascular:      Rate and Rhythm: Normal rate and regular rhythm.      Pulses: Normal pulses.      Heart sounds: Normal heart sounds. No murmur heard.  Pulmonary:      Effort: Pulmonary effort  is normal. No respiratory distress.      Breath sounds: Normal breath sounds. No stridor. No wheezing or rhonchi.   Musculoskeletal:      Right lower leg: No edema.      Left lower leg: No edema.   Skin:     Findings: No rash.   Neurological:      Mental Status: She is alert and oriented to person, place, and time.   Psychiatric:         Mood and Affect: Mood normal.          ASSESSMENT AND PLAN:   1. Type 2 diabetes mellitus without complication, without long-term current use of insulin (HCC)  Due for labs, expect improvement based on FBG readings. Continue present mgmt. She might have atopic dermatitis d/t adhesive on sensor - she will try using again, if has similar reaction advised to stop. Can use topical triamcinolone as needed for rash. Can return back to accucheks if needed.     Goal: A1c < 7   Medications: metformin, rybelsus  Diet & exercise: diabetic diet, regular exericse.   Daily accucheks are appropriate  Hypoglycemic episodes?: none   Labs: UTD  CV: continue statin  Renal: UTD  Eye: UTD  Foot: UTD  Immunizations: UTD   RTC in 3mo for medicare physical.     - Hemoglobin A1C [E]; Future  - Comp Metabolic Panel (14) [E]; Future  - Lipid Panel; Future  - Semaglutide (RYBELSUS) 7 MG Oral Tab; Take 1 tablet by mouth daily.  Dispense: 90 tablet; Refill: 1    2. Essential hypertension  Stable, CPM.     3. Carpal tunnel syndrome, unspecified laterality  Symptomatic, R>L. F/u with ortho. Continue supportive care measures, wrist splints, wrist exercises etc.   - Ortho Referral - In Network    4. Chronic cough  Improving, CPM.       Meds & Refills for this Visit:  Requested Prescriptions     Signed Prescriptions Disp Refills    Semaglutide (RYBELSUS) 7 MG Oral Tab 90 tablet 1     Sig: Take 1 tablet by mouth daily.       Health Maintenance:  Health Maintenance Due   Topic Date Due    MA Annual Health Assessment  Never done    COVID-19 Vaccine (5 - 2023-24 season) 09/01/2024    Pap Smear  12/22/2024        Patient/Caregiver Education: Patient/Caregiver Education: There are no barriers to learning. Medical education done.   Outcome: Patient verbalizes understanding. Patient is notified to call with any questions, complications, allergies, or worsening or changing symptoms.  Patient is to call with any side effects or complications from the treatments as a result of today.     Problem List:  Patient Active Problem List   Diagnosis    Moderate persistent asthma without complication (HCC)    Hyperlipidemia    Essential hypertension    Type 2 diabetes mellitus without complication, without long-term current use of insulin (HCC)

## 2024-10-01 ENCOUNTER — TELEPHONE (OUTPATIENT)
Dept: FAMILY MEDICINE CLINIC | Facility: CLINIC | Age: 64
End: 2024-10-01

## 2024-10-04 ENCOUNTER — TELEPHONE (OUTPATIENT)
Dept: FAMILY MEDICINE CLINIC | Facility: CLINIC | Age: 64
End: 2024-10-04

## 2024-10-04 NOTE — TELEPHONE ENCOUNTER
Patient made an appointment for 10/8/24 for her flu shot. She inquired if she is due for any other vaccines because she would like to get them done at the same time.

## 2024-10-08 ENCOUNTER — IMMUNIZATION (OUTPATIENT)
Dept: FAMILY MEDICINE CLINIC | Facility: CLINIC | Age: 64
End: 2024-10-08
Payer: COMMERCIAL

## 2024-10-08 DIAGNOSIS — Z23 NEED FOR VACCINATION: Primary | ICD-10-CM

## 2024-10-08 PROCEDURE — G0008 ADMIN INFLUENZA VIRUS VAC: HCPCS | Performed by: FAMILY MEDICINE

## 2024-10-08 PROCEDURE — 90662 IIV NO PRSV INCREASED AG IM: CPT | Performed by: FAMILY MEDICINE

## 2024-10-20 DIAGNOSIS — D50.9 IRON DEFICIENCY ANEMIA, UNSPECIFIED IRON DEFICIENCY ANEMIA TYPE: ICD-10-CM

## 2024-10-21 RX ORDER — LIDOCAINE HYDROCHLORIDE 20 MG/ML
1 SOLUTION ORAL; TOPICAL
Qty: 30 TABLET | Refills: 5 | Status: SHIPPED | OUTPATIENT
Start: 2024-10-21

## 2024-10-24 ENCOUNTER — HOSPITAL ENCOUNTER (OUTPATIENT)
Dept: CT IMAGING | Age: 64
Discharge: HOME | End: 2024-10-24
Attending: FAMILY MEDICINE
Payer: MEDICARE

## 2024-10-24 DIAGNOSIS — R93.89 ABNORMAL SCREENING CT OF CHEST: ICD-10-CM

## 2024-10-24 PROCEDURE — 71250 CT THORAX DX C-: CPT | Performed by: FAMILY MEDICINE

## 2024-10-29 ENCOUNTER — HOSPITAL ENCOUNTER (OUTPATIENT)
Dept: GENERAL RADIOLOGY | Age: 64
Discharge: HOME | End: 2024-10-29
Attending: ORTHOPAEDIC SURGERY
Payer: MEDICARE

## 2024-10-29 DIAGNOSIS — M79.642 BILATERAL HAND PAIN: ICD-10-CM

## 2024-10-29 DIAGNOSIS — M79.641 BILATERAL HAND PAIN: ICD-10-CM

## 2024-10-29 PROCEDURE — 73130 X-RAY EXAM OF HAND: CPT | Performed by: ORTHOPAEDIC SURGERY

## 2024-11-13 RX ORDER — OMEPRAZOLE 40 MG/1
40 CAPSULE, DELAYED RELEASE ORAL DAILY
Qty: 30 CAPSULE | Refills: 0 | Status: SHIPPED | OUTPATIENT
Start: 2024-11-13

## 2024-11-22 ENCOUNTER — TELEPHONE (OUTPATIENT)
Dept: ORTHOPEDICS CLINIC | Facility: CLINIC | Age: 64
End: 2024-11-22

## 2024-11-22 ENCOUNTER — OFFICE VISIT (OUTPATIENT)
Dept: ORTHOPEDICS CLINIC | Facility: CLINIC | Age: 64
End: 2024-11-22
Payer: COMMERCIAL

## 2024-11-22 VITALS — WEIGHT: 190 LBS | HEIGHT: 64 IN | BODY MASS INDEX: 32.44 KG/M2

## 2024-11-22 DIAGNOSIS — G56.02 CARPAL TUNNEL SYNDROME OF LEFT WRIST: ICD-10-CM

## 2024-11-22 DIAGNOSIS — G56.01 CARPAL TUNNEL SYNDROME OF RIGHT WRIST: Primary | ICD-10-CM

## 2024-11-22 DIAGNOSIS — G56.02 CARPAL TUNNEL SYNDROME OF LEFT WRIST: Primary | ICD-10-CM

## 2024-11-22 PROCEDURE — 99205 OFFICE O/P NEW HI 60 MIN: CPT | Performed by: ORTHOPAEDIC SURGERY

## 2024-11-22 PROCEDURE — 3008F BODY MASS INDEX DOCD: CPT | Performed by: ORTHOPAEDIC SURGERY

## 2024-11-22 NOTE — H&P
Clinic Note      Assessment/Plan:  64 year old female    Right carpal tunnel syndrome -EMG/NCV confirmed at [facility]-the more symptomatic side we will proceed with decompression of the median nerve.  Patient has failed nonsurgical management which includes bracing and therapy.  CTR Consent: Non-surgical and surgical treatment options where reviewed with the patient. Patient elected to proceed with surgical decompression of median nerve in the carpal tunnel. Patient consented to surgery after having understood the risks associated with surgery, expected outcomes, time to recovery and the possible need for therapy post-operatively. Risks include but not limited to: infection, wound complication, nerve injury resulting in temporary or permanent nerve damage, finger/hand stiffness, persistent pain/symptoms, swelling, CRPS, recurrence of carpal tunnel syndrome, and need for additional surgery.  Surgery scheduled for January 30, 2025  Left carpal tunnel syndrome -EMG/NCV confirmed at [facility]-we will proceed with the left side 6 weeks later  CTR Consent: Non-surgical and surgical treatment options where reviewed with the patient. Patient elected to proceed with surgical decompression of median nerve in the carpal tunnel. Patient consented to surgery after having understood the risks associated with surgery, expected outcomes, time to recovery and the possible need for therapy post-operatively. Risks include but not limited to: infection, wound complication, nerve injury resulting in temporary or permanent nerve damage, finger/hand stiffness, persistent pain/symptoms, swelling, CRPS, recurrence of carpal tunnel syndrome, and need for additional surgery.  Surgery scheduled for March 13, 2025    Follow Up: Day of surgery    Diagnostic Studies:     EMG/NCV: Performed at [facility] 5+ years ago showing carpal tunnel syndrome bilaterally.  No axonal loss was noted at that time     Physical Exam:    Ht 5' 4\" (1.626 m)   Wt 190 lb (86.2 kg)    LMP  (LMP Unknown)   BMI 32.61 kg/m²     Constitutional: NAD. AOx3. Well-developed and Well-nourished.   Psychiatric: Normal mood/ affect/ behavior. Judgment and thought content normal.     Bilateral Upper Extremity:   Inspection: Skin Intact. No skin lesions. No gross deformity.   Palpation:  No focal areas of TTP   Motion: Elbow: normal bilateral symmetric ext/flex  Wrist: normal bilateral symmetric ext/flex/sup/pro  Finger: full composite fist   Vascular 2+ radial pulse       Median Nerve Exam Right Left   Phdurkan + +   Sensation normal, tingling is noted in the fingertips normal, tingling is noted in the fingertips   PCBr Sensation normal normal   APB Weakness No No   Thenar Atrophy No No     Ulnar Nerve Exam Right Left   Sensation normal normal   1st JONATHAN Weakness No No   Hypothenar Atrophy No No     Radial Nerve Exam  Right Left   Radial Sensory normal normal       CC: Bilateral hand pain    HPI: This 64 year old RHD female presents with numbness and tingling in bilateral hands.  Patient reports constant tingling.  Intermittent numbness.  Is been going on since 2018.  She is done bracing at nighttime as well as therapy with progressively worsening symptoms.  The right side is worse than the left.  She wakes up 2 times per night.  Symptom pain is severe      Past Medical History:    Arthritis    Asthma (HCC)    Diabetes (HCC)    Hypertension     Past Surgical History:   Procedure Laterality Date    Colonoscopy  2023    Colonoscopy  2023    Hysterectomy            Tubal ligation  20years     Current Outpatient Medications   Medication Sig Dispense Refill    OMEPRAZOLE 40 MG Oral Capsule Delayed Release TAKE 1 CAPSULE(40 MG) BY MOUTH DAILY 30 capsule 0    FEROSUL 325 (65 Fe) MG Oral Tab TAKE 1 TABLET BY MOUTH DAILY WITH BREAKFAST 30 tablet 5    Continuous Glucose Sensor (FREESTYLE BHAVIN 14 DAY SENSOR) Does not apply Misc 1 each every 14 (fourteen) days. 2 each 11    Nebulizers ([employer] AEROSOL  DELIVERY SYSTEM) Does not apply Misc Take 1 Bottle by mouth As Directed.      budesonide 0.5 MG/2ML Inhalation Suspension Take 2 mL (0.5 mg total) by nebulization daily. 120 mL 5    Respiratory Therapy Supplies (FULL KIT NEBULIZER SET) Does not apply Misc 1 kit As Directed. 1 each 0    TURMERIC OR Take 1 tablet by mouth As Directed.      fluticasone-salmeterol (ADVAIR DISKUS) 100-50 MCG/ACT Inhalation Aerosol Powder, Breath Activated Inhale 1 puff into the lungs 2 (two) times daily. 60 each 2    VENTOLIN  (90 Base) MCG/ACT Inhalation Aero Soln Inhale 2 puffs into the lungs every 4 (four) hours as needed for Wheezing or Shortness of Breath.      ascorbic acid 500 MG Oral Chew Tab Chew 1 tablet (500 mg total) by mouth daily.      atorvastatin 20 MG Oral Tab Take 1 tablet (20 mg total) by mouth daily.      Cholecalciferol 50 MCG (2000 UT) Oral Cap Take 2,000 Units by mouth daily.      diclofenac 1 % External Gel Apply 1 Application topically as needed.      fluticasone propionate 50 MCG/ACT Nasal Suspension 1 spray by Nasal route daily.      ketoconazole 2 % External Cream Apply 1 Application topically 2 (two) times daily.      Losartan Potassium-HCTZ 100-12.5 MG Oral Tab Take 1 tablet by mouth daily.      metFORMIN HCl 1000 MG Oral Tab Take 1 tablet (1,000 mg total) by mouth in the morning and 1 tablet (1,000 mg total) before bedtime.      Multiple Vitamins-Minerals (ONE-A-DAY WOMENS 50 PLUS) Oral Tab Take 1 tablet by mouth daily.      Omega-3 Fatty Acids (FISH OIL) 1200 MG Oral Capsule Delayed Release Take 1 tablet by mouth daily.      triamcinolone 0.1 % External Ointment Apply 1 Application topically 2 (two) times daily.      Accu-Chek Softclix Lancets Does not apply Misc Use as directed to measure blood sugar once daily. 100 each 3    Blood Glucose Monitoring Suppl (ACCU-CHEK GUIDE ME) w/Device Does not apply Kit 1 each daily. Use as directed to measure blood sugar once daily. 1 kit 0    Glucose Blood  (ACCU-CHEK GUIDE) In Vitro Strip Use as directed to measure blood sugar once daily. 100 strip 3    benzonatate 200 MG Oral Cap Take 1 capsule (200 mg total) by mouth 3 (three) times daily as needed for cough. 30 capsule 0     Allergies[1]  Family History   Problem Relation Age of Onset    Diabetes Mother     Other (circulation problems) Mother     Colon Cancer Father     Cancer Father      Social History     Occupational History    Not on file   Tobacco Use    Smoking status: Never    Smokeless tobacco: Never   Vaping Use    Vaping status: Never Used   Substance and Sexual Activity    Alcohol use: Never    Drug use: Never    Sexual activity: Not on file        Review of Systems (negative unless bolded):  General: fevers, chills, fatigue  CV:  chest pain, palpitations, leg swelling  Msk: bodyaches, neck pain, neck stiffness  Skin: rashes, open wounds, nonhealing ulcers  Hem: bleeds easily, bruise easily, immunocompromised  Neuro: dizziness, light headedness, headaches  Psych: anxious, depressed, anger issues    Douglas Tobias MD   Hand, Wrist, & Elbow Surgery  [email or web address]  t: [phone]  f: [phone]         [1] No Known Allergies

## 2024-11-22 NOTE — TELEPHONE ENCOUNTER
Date of Surgery: 25     Post Op Appt: 2025 720AM    Case ID: [id number]    Notes:     SURGICAL BOOKING SHEET   Name: Susan Will  MRN: [id number]   : 1960     Surgical Date:    25   Surgical Consent:    Right endoscopic carpal tunnel release, possible open   Diagnosis:         ICD-10-CM   1. Carpal tunnel syndrome of right wrist  G56.01   2. Carpal tunnel syndrome of left wrist  G56.02       Workers Comp: No   Procedure Codes:    Endoscopic carpal tunnel release (CPT 14270)   Disposition:    Outpatient   Operative Time:    15 mins   Antibiotics:    Ancef 2g   Anesthesia Type:    Monitored Anesthesia Care (MAC)   Clearance:     None   Equipment:    ECTR: [place] Blade, Microaire Endoscopic System, Local Pre-Mix (1% lidocaine w/ epi, 0.5% marcaine, and 8.4% NaBicarb) , 5-0 moncryl, Exofin, Telfa+Tegaderm   Standby: Lead Hand, 4-0 Nylon PS-2   Assistant:    Jatinder Assistant: Yes, Brittney Lua PA-C   Follow Up:    7-14 days post op with Brittney   Pain Medication:    Tramadol 50 mg   Therapy:    None

## 2024-11-22 NOTE — TELEPHONE ENCOUNTER
Date of Surgery: 2025    Post Op Appt: 2025 720AM    Case ID: [id number]    Notes:     SURGICAL BOOKING SHEET   Name: Susan Will  MRN: [id number]   : 1960     Surgical Date:    3/13/25   Surgical Consent:    Left endoscopic carpal tunnel release, possible open   Diagnosis:         ICD-10-CM   1. Carpal tunnel syndrome of right wrist  G56.01   2. Carpal tunnel syndrome of left wrist  G56.02       Workers Comp: No   Procedure Codes:    Endoscopic carpal tunnel release (CPT 88980)   Disposition:    Outpatient   Operative Time:    15 mins   Antibiotics:    Ancef 2g   Anesthesia Type:    Monitored Anesthesia Care (MAC)   Clearance:     None   Equipment:    ECTR: [place] Blade, Microaire Endoscopic System, Local Pre-Mix (1% lidocaine w/ epi, 0.5% marcaine, and 8.4% NaBicarb) , 5-0 moncryl, Exofin, Telfa+Tegaderm   Standby: Lead Hand, 4-0 Nylon PS-2   Assistant:    Jatinder Assistant: Yes, Brittney Lua PA-C   Follow Up:    7-14 days post op with Brittney   Pain Medication:    Tramadol 50 mg   Therapy:    None

## 2024-11-22 NOTE — PROGRESS NOTES
SURGICAL BOOKING SHEET   Name: Susan Will  MRN: [id number]   : 1960    Surgical Date:   3/13/25   Surgical Consent:   Left endoscopic carpal tunnel release, possible open   Diagnosis:      ICD-10-CM   1. Carpal tunnel syndrome of right wrist  G56.01   2. Carpal tunnel syndrome of left wrist  G56.02       Workers Comp: No   Procedure Codes:   Endoscopic carpal tunnel release (CPT 33344)   Disposition:   Outpatient   Operative Time:   15 mins   Antibiotics:   Ancef 2g   Anesthesia Type:   Monitored Anesthesia Care (MAC)   Clearance:    None   Equipment:   ECTR: [place] Blade, Microaire Endoscopic System, Local Pre-Mix (1% lidocaine w/ epi, 0.5% marcaine, and 8.4% NaBicarb) , 5-0 moncryl, Exofin, Telfa+Tegaderm   Standby: Lead Hand, 4-0 Nylon PS-2   Assistant:   Jatinder Assistant: Yes, Brittney Lua PA-C   Follow Up:   7-14 days post op with Brittney   Pain Medication:   Tramadol 50 mg   Therapy:   None

## 2024-11-22 NOTE — PROGRESS NOTES
SURGICAL BOOKING SHEET   Name: Susan Will  MRN: [id number]   : 1960    Surgical Date:   25   Surgical Consent:   Right endoscopic carpal tunnel release, possible open   Diagnosis:      ICD-10-CM   1. Carpal tunnel syndrome of right wrist  G56.01   2. Carpal tunnel syndrome of left wrist  G56.02       Workers Comp: No   Procedure Codes:   Endoscopic carpal tunnel release (CPT 57510)   Disposition:   Outpatient   Operative Time:   15 mins   Antibiotics:   Ancef 2g   Anesthesia Type:   Monitored Anesthesia Care (MAC)   Clearance:    None   Equipment:   ECTR: [place] Blade, Microaire Endoscopic System, Local Pre-Mix (1% lidocaine w/ epi, 0.5% marcaine, and 8.4% NaBicarb) , 5-0 moncryl, Exofin, Telfa+Tegaderm   Standby: Lead Hand, 4-0 Nylon PS-2   Assistant:   Jatinder Assistant: Yes, Brittney Lua PA-C   Follow Up:   7-14 days post op with Brittney   Pain Medication:   Tramadol 50 mg   Therapy:   None

## 2024-12-10 ENCOUNTER — PATIENT MESSAGE (OUTPATIENT)
Dept: ORTHOPEDICS CLINIC | Facility: CLINIC | Age: 64
End: 2024-12-10

## 2025-01-13 ENCOUNTER — OFFICE VISIT (OUTPATIENT)
Dept: FAMILY MEDICINE CLINIC | Facility: CLINIC | Age: 65
End: 2025-01-13
Payer: COMMERCIAL

## 2025-01-13 ENCOUNTER — LAB ENCOUNTER (OUTPATIENT)
Dept: LAB | Age: 65
End: 2025-01-13
Attending: FAMILY MEDICINE
Payer: MEDICARE

## 2025-01-13 VITALS
DIASTOLIC BLOOD PRESSURE: 68 MMHG | RESPIRATION RATE: 18 BRPM | WEIGHT: 189 LBS | HEART RATE: 77 BPM | HEIGHT: 64 IN | OXYGEN SATURATION: 98 % | BODY MASS INDEX: 32.27 KG/M2 | SYSTOLIC BLOOD PRESSURE: 116 MMHG

## 2025-01-13 DIAGNOSIS — R91.1 PULMONARY NODULE: ICD-10-CM

## 2025-01-13 DIAGNOSIS — Z29.11 NEED FOR RSV IMMUNIZATION: ICD-10-CM

## 2025-01-13 DIAGNOSIS — J45.40 MODERATE PERSISTENT ASTHMA WITHOUT COMPLICATION (HCC): ICD-10-CM

## 2025-01-13 DIAGNOSIS — G56.00 CARPAL TUNNEL SYNDROME, UNSPECIFIED LATERALITY: ICD-10-CM

## 2025-01-13 DIAGNOSIS — B35.1 ONYCHOMYCOSIS: ICD-10-CM

## 2025-01-13 DIAGNOSIS — Z00.00 LABORATORY EXAMINATION ORDERED AS PART OF A ROUTINE GENERAL MEDICAL EXAMINATION: ICD-10-CM

## 2025-01-13 DIAGNOSIS — E78.5 HYPERLIPIDEMIA, UNSPECIFIED HYPERLIPIDEMIA TYPE: ICD-10-CM

## 2025-01-13 DIAGNOSIS — Z12.31 ENCOUNTER FOR SCREENING MAMMOGRAM FOR MALIGNANT NEOPLASM OF BREAST: ICD-10-CM

## 2025-01-13 DIAGNOSIS — E11.9 TYPE 2 DIABETES MELLITUS WITHOUT COMPLICATION, WITHOUT LONG-TERM CURRENT USE OF INSULIN (HCC): ICD-10-CM

## 2025-01-13 DIAGNOSIS — Z00.00 WELLNESS EXAMINATION: Primary | ICD-10-CM

## 2025-01-13 DIAGNOSIS — K21.9 GASTROESOPHAGEAL REFLUX DISEASE, UNSPECIFIED WHETHER ESOPHAGITIS PRESENT: ICD-10-CM

## 2025-01-13 DIAGNOSIS — I10 ESSENTIAL HYPERTENSION: ICD-10-CM

## 2025-01-13 DIAGNOSIS — B36.9 FUNGAL DERMATITIS: ICD-10-CM

## 2025-01-13 DIAGNOSIS — E55.9 VITAMIN D DEFICIENCY: ICD-10-CM

## 2025-01-13 DIAGNOSIS — Z01.818 PREOP EXAMINATION: ICD-10-CM

## 2025-01-13 LAB
ALBUMIN SERPL-MCNC: 4.6 G/DL (ref 3.2–4.8)
ALBUMIN/GLOB SERPL: 1.6 {RATIO} (ref 1–2)
ALP LIVER SERPL-CCNC: 66 U/L
ALT SERPL-CCNC: 16 U/L
ANION GAP SERPL CALC-SCNC: 5 MMOL/L (ref 0–18)
AST SERPL-CCNC: 19 U/L (ref ?–34)
BILIRUB SERPL-MCNC: 0.5 MG/DL (ref 0.2–1.1)
BUN BLD-MCNC: 10 MG/DL (ref 9–23)
CALCIUM BLD-MCNC: 10 MG/DL (ref 8.7–10.4)
CHLORIDE SERPL-SCNC: 105 MMOL/L (ref 98–112)
CHOLEST SERPL-MCNC: 157 MG/DL (ref ?–200)
CO2 SERPL-SCNC: 29 MMOL/L (ref 21–32)
CREAT BLD-MCNC: 0.82 MG/DL
CREAT UR-SCNC: 34.6 MG/DL
EGFRCR SERPLBLD CKD-EPI 2021: 79 ML/MIN/1.73M2 (ref 60–?)
EST. AVERAGE GLUCOSE BLD GHB EST-MCNC: 148 MG/DL (ref 68–126)
FASTING PATIENT LIPID ANSWER: NO
FASTING STATUS PATIENT QL REPORTED: NO
GLOBULIN PLAS-MCNC: 2.8 G/DL (ref 2–3.5)
GLUCOSE BLD-MCNC: 121 MG/DL (ref 70–99)
HBA1C MFR BLD: 6.8 % (ref ?–5.7)
HDLC SERPL-MCNC: 89 MG/DL (ref 40–59)
LDLC SERPL CALC-MCNC: 57 MG/DL (ref ?–100)
MICROALBUMIN UR-MCNC: <0.3 MG/DL
NONHDLC SERPL-MCNC: 68 MG/DL (ref ?–130)
OSMOLALITY SERPL CALC.SUM OF ELEC: 288 MOSM/KG (ref 275–295)
POTASSIUM SERPL-SCNC: 4 MMOL/L (ref 3.5–5.1)
PROT SERPL-MCNC: 7.4 G/DL (ref 5.7–8.2)
SODIUM SERPL-SCNC: 139 MMOL/L (ref 136–145)
TRIGL SERPL-MCNC: 51 MG/DL (ref 30–149)
VLDLC SERPL CALC-MCNC: 7 MG/DL (ref 0–30)

## 2025-01-13 PROCEDURE — 80061 LIPID PANEL: CPT

## 2025-01-13 PROCEDURE — 80053 COMPREHEN METABOLIC PANEL: CPT

## 2025-01-13 PROCEDURE — 83036 HEMOGLOBIN GLYCOSYLATED A1C: CPT

## 2025-01-13 PROCEDURE — 82570 ASSAY OF URINE CREATININE: CPT

## 2025-01-13 PROCEDURE — 82043 UR ALBUMIN QUANTITATIVE: CPT

## 2025-01-13 PROCEDURE — 36415 COLL VENOUS BLD VENIPUNCTURE: CPT

## 2025-01-13 RX ORDER — OMEPRAZOLE 40 MG/1
40 CAPSULE, DELAYED RELEASE ORAL DAILY
Qty: 90 CAPSULE | Refills: 3 | Status: SHIPPED | OUTPATIENT
Start: 2025-01-13

## 2025-01-13 RX ORDER — KETOCONAZOLE 20 MG/G
1 CREAM TOPICAL 2 TIMES DAILY
Qty: 60 G | Refills: 11 | Status: SHIPPED | OUTPATIENT
Start: 2025-01-13

## 2025-01-13 RX ORDER — ATORVASTATIN CALCIUM 20 MG/1
20 TABLET, FILM COATED ORAL DAILY
Qty: 90 TABLET | Refills: 3 | Status: SHIPPED | OUTPATIENT
Start: 2025-01-13

## 2025-01-13 RX ORDER — ALBUTEROL SULFATE 90 UG/1
2 AEROSOL, METERED RESPIRATORY (INHALATION) EVERY 4 HOURS PRN
Qty: 3 EACH | Refills: 3 | Status: SHIPPED | OUTPATIENT
Start: 2025-01-13

## 2025-01-13 RX ORDER — FLUTICASONE PROPIONATE AND SALMETEROL 100; 50 UG/1; UG/1
1 POWDER RESPIRATORY (INHALATION) 2 TIMES DAILY
Qty: 180 EACH | Refills: 3 | Status: SHIPPED | OUTPATIENT
Start: 2025-01-13

## 2025-01-13 RX ORDER — LOSARTAN POTASSIUM AND HYDROCHLOROTHIAZIDE 12.5; 1 MG/1; MG/1
1 TABLET ORAL DAILY
Qty: 90 TABLET | Refills: 3 | Status: SHIPPED | OUTPATIENT
Start: 2025-01-13

## 2025-01-13 NOTE — PROGRESS NOTES
Subjective:   Susan Will is a 65 year old female who presents for a MA AHA (Medicare Advantage Annual Health Assessment) and scheduled follow up of multiple significant but stable problems.     Doing well overall, no acute concerns.     T2DM - reports rybelsus wasn't covered. Has been compliant with metformin. Last A1c value was 6.7% done 9/27/2024.    History/Other:   Fall Risk Assessment:   She has been screened for Falls and is High Risk. Fall Prevention information provided to patient in After Visit Summary.    Do you feel unsteady when standing or walking?: No  Do you worry about falling?: No  Have you fallen in the past year?: Yes  How many times have you fallen?: 1  Were you injured?: No     Cognitive Assessment:   She had a completely normal cognitive assessment - see flowsheet entries     Functional Ability/Status:   Susan Will has a completely normal functional assessment. See flowsheet for details.    Depression Screening (PHQ):  PHQ-2 SCORE: 0  , done 1/13/2025     Advanced Directives:   She does NOT have a Living Will. [Do you have a living will?: No]  She does NOT have a Power of  for Health Care. [Do you have a healthcare power of ?: No]  Not discussed    Patient Active Problem List   Diagnosis    Moderate persistent asthma without complication (HCC)    Hyperlipidemia    Essential hypertension    Type 2 diabetes mellitus without complication, without long-term current use of insulin (HCC)    Gastroesophageal reflux disease     Allergies:  She has No Known Allergies.    Current Medications:  Outpatient Medications Marked as Taking for the 1/13/25 encounter (Office Visit) with Axel Tobias MD   Medication Sig    atorvastatin 20 MG Oral Tab Take 1 tablet (20 mg total) by mouth daily.    Cholecalciferol 50 MCG (2000 UT) Oral Cap Take 2,000 Units by mouth daily.    ketoconazole 2 % External Cream Apply 1 Application topically 2 (two) times daily.    Losartan  Potassium-HCTZ 100-12.5 MG Oral Tab Take 1 tablet by mouth daily.    metFORMIN HCl 1000 MG Oral Tab Take 1 tablet (1,000 mg total) by mouth in the morning and 1 tablet (1,000 mg total) before bedtime.    VENTOLIN  (90 Base) MCG/ACT Inhalation Aero Soln Inhale 2 puffs into the lungs every 4 (four) hours as needed for Wheezing or Shortness of Breath.    Omeprazole 40 MG Oral Capsule Delayed Release Take 1 capsule (40 mg total) by mouth daily.    fluticasone-salmeterol (ADVAIR DISKUS) 100-50 MCG/ACT Inhalation Aerosol Powder, Breath Activated Inhale 1 puff into the lungs 2 (two) times daily.    Respiratory Syncytial Virus Vaccine 120 MCG/0.5ML Intramuscular Recon Susp Inject 0.5 mL into the muscle one time for 1 dose. Please administer vaccine at pharmacy    FEROSUL 325 (65 Fe) MG Oral Tab TAKE 1 TABLET BY MOUTH DAILY WITH BREAKFAST    Continuous Glucose Sensor (FREESTYLE BHAVIN 14 DAY SENSOR) Does not apply Misc 1 each every 14 (fourteen) days.    budesonide 0.5 MG/2ML Inhalation Suspension Take 2 mL (0.5 mg total) by nebulization daily.    TURMERIC OR Take 1 tablet by mouth As Directed.    ascorbic acid 500 MG Oral Chew Tab Chew 1 tablet (500 mg total) by mouth daily.    diclofenac 1 % External Gel Apply 1 Application topically as needed.    fluticasone propionate 50 MCG/ACT Nasal Suspension 1 spray by Nasal route daily.    Multiple Vitamins-Minerals (ONE-A-DAY WOMENS 50 PLUS) Oral Tab Take 1 tablet by mouth daily.    Omega-3 Fatty Acids (FISH OIL) 1200 MG Oral Capsule Delayed Release Take 1 tablet by mouth daily.    triamcinolone 0.1 % External Ointment Apply 1 Application topically 2 (two) times daily.    Accu-Chek Softclix Lancets Does not apply Misc Use as directed to measure blood sugar once daily.    Blood Glucose Monitoring Suppl (ACCU-CHEK GUIDE ME) w/Device Does not apply Kit 1 each daily. Use as directed to measure blood sugar once daily.    Glucose Blood (ACCU-CHEK GUIDE) In Vitro Strip Use as  directed to measure blood sugar once daily.    benzonatate 200 MG Oral Cap Take 1 capsule (200 mg total) by mouth 3 (three) times daily as needed for cough.       Medical History:  She  has a past medical history of Arthritis (), Asthma (HCC) (), Diabetes (HCC), and Hypertension.  Surgical History:  She  has a past surgical history that includes hysterectomy; colonoscopy (2023); colonoscopy (2023);  (); and tubal ligation (20years).   Family History:  Her family history includes Cancer in her father; Colon Cancer in her father; Diabetes in her mother; circulation problems in her mother.  Social History:  She  reports that she has never smoked. She has never used smokeless tobacco. She reports that she does not drink alcohol and does not use drugs.    Tobacco:  She has never smoked tobacco.    CAGE Alcohol Screen:   CAGE screening score of 0 on 2025, showing low risk of alcohol abuse.      Patient Care Team:  Axel Tobias MD as PCP - General (Family Medicine)    Review of Systems  Constitutional: negative  Eyes: negative  ENT: negative  Respiratory: negative  Cardiovascular: negative  Gastrointestinal: negative  Integument/Breast: negative  Genitourinary: negative  Heme/Lymph: negative  Musculoskeletal: negative  Neurological: negative  Psych: negative  Endocrine: negative  Allergic/Immune: negative    Objective:   Physical Exam  General: alert, appears stated age, and cooperative  Head: Normocephalic, without obvious abnormality, atraumatic  Eyes: negative  Ears: normal TM's and external ear canals both ears  Nose: Nares normal. Septum midline. Mucosa normal. No drainage or sinus tenderness.  Throat: lips, mucosa, and tongue normal; teeth and gums normal  Neck: no adenopathy, supple, symmetrical, trachea midline, and thyroid not enlarged, symmetric, no tenderness/mass/nodules  Heart: S1, S2 normal, no murmur, click, rub or gallop, regular rate and rhythm  Lungs: clear to  auscultation bilaterally  Breast: deferred  Abdomen: soft, non-tender; bowel sounds normal; no masses,  no organomegaly  Pelvic: deferred  Back: negative  Extremities: extremities normal, atraumatic, no cyanosis or edema  Pulses: 2+ and symmetric  Skin: Skin color, texture, turgor normal. No rashes or lesions  Lymph Nodes: No LAD  Neurologic: Grossly normal    Bilateral barefoot skin diabetic exam is normal, visualized feet and the appearance is normal. She has onychomycosis on multiple digits b/l  Bilateral monofilament/sensation of both feet is normal.  Pulsation pedal pulse exam of both lower legs/feet is normal as well.    /68   Pulse 77   Resp 18   Ht 5' 4\" (1.626 m)   Wt 189 lb (85.7 kg)   LMP  (LMP Unknown)   SpO2 98%   BMI 32.44 kg/m²  Estimated body mass index is 32.44 kg/m² as calculated from the following:    Height as of this encounter: 5' 4\" (1.626 m).    Weight as of this encounter: 189 lb (85.7 kg).    Medicare Hearing Assessment:   Hearing Screening    Screening Method: Whisper Test  Whisper Test Result: Pass       Assessment & Plan:   Susan Will is a 65 year old female who presents for a Medicare Assessment.     Wellness examination  -Immunizations: Recommended RSV vaccine   -Metabolic: BMI 32. BP wnl. Due for annual labs   -Cancer screening: mammogram due in 02/2025  -Communicable disease: low risk   -Mental health: no concerns   -Other preventative: follow with dentistry and optometry.   -Lifestyle: Follow a well balanced healthy diet with emphasis on fruits, vegetables, whole grains, lean meats. Limit processed and junk foods. Aim for at least 150 minutes of moderate intensity exercise weekly. Make sure you are staying adequately hydrated. Aim to get 7-9 hours of sleep nightly.     Laboratory examination ordered as part of a routine general medical examination  -     Comp Metabolic Panel (14); Future  -     Lipid Panel; Future    Type 2 diabetes mellitus without complication,  without long-term current use of insulin (HCC)  Excellent control. CPM  Goal: A1c < 7   Medications: DM Meds: metFORMIN HCl Tabs - 1000 MGMeds : Kept the same    Diet & exercise: diabetic diet, regular exericse.   Daily accucheks are appropriate  Hypoglycemic episodes?: none   Labs: due   CV: continue statin  Renal: due   Eye: UTD  Foot: UTD   Immunizations: UTD  RTC in 6mo for T2DM follow up.     -     Microalb/Creat Ratio, Random Urine; Future  -     Hemoglobin A1C; Future  -     atorvastatin 20 MG Oral Tab; Take 1 tablet (20 mg total) by mouth daily.  -     metFORMIN HCl 1000 MG Oral Tab; Take 1 tablet (1,000 mg total) by mouth in the morning and 1 tablet (1,000 mg total) before bedtime.    Hyperlipidemia, unspecified hyperlipidemia type  Stable, CPM    Moderate persistent asthma without complication (HCC)  Stable, CPM   -     VENTOLIN  (90 Base) MCG/ACT Inhalation Aero Soln; Inhale 2 puffs into the lungs every 4 (four) hours as needed for Wheezing or Shortness of Breath.  -     fluticasone-salmeterol (ADVAIR DISKUS) 100-50 MCG/ACT Inhalation Aerosol Powder, Breath Activated; Inhale 1 puff into the lungs 2 (two) times daily.    Essential hypertension  Stable, CPM  -     Losartan Potassium-HCTZ 100-12.5 MG Oral Tab; Take 1 tablet by mouth daily.    Gastroesophageal reflux disease, unspecified whether esophagitis present  Stable, CPM  -     Omeprazole 40 MG Oral Capsule Delayed Release; Take 1 capsule (40 mg total) by mouth daily.    Vitamin D deficiency  -     Cholecalciferol 50 MCG (2000 UT) Oral Cap; Take 2,000 Units by mouth daily.    Fungal dermatitis  -     ketoconazole 2 % External Cream; Apply 1 Application topically 2 (two) times daily.    Carpal tunnel syndrome, unspecified laterality  Has scheduled L CTS release for 1/30/25 and R CTS release on 03/13/25 with Dr Douglas Tobias at .   RCRI 0 - class 1 risk. Excellent functional capacity >4 METs.   She is a good surgical candidate, low risk.      Preop examination  See above    Encounter for screening mammogram for malignant neoplasm of breast  -     [facility] 2D+3D SCREENING BILAT (CPT=77067/65943); Future    Need for RSV immunization  -     Respiratory Syncytial Virus Vaccine 120 MCG/0.5ML Intramuscular Recon Susp; Inject 0.5 mL into the muscle one time for 1 dose. Please administer vaccine at pharmacy    Pulmonary nodule  Has repeat CT scheduled.     Onychomycosis  Declines treatment today, will monitor.     The patient indicates understanding of these issues and agrees to the plan.  Reinforced healthy diet, lifestyle, and exercise.      Return in about 6 months (around 7/13/2025).     Axel Tobias MD, 1/13/2025     Supplementary Documentation:   General Health:  In the past six months, have you lost more than 10 pounds without trying?: 2 - No  Has your appetite been poor?: No  Type of Diet: Balanced  How does the patient maintain a good energy level?: Daily Walks;Stretching  How would you describe your daily physical activity?: Moderate  How would you describe your current health state?: Good  How do you maintain positive mental well-being?: Social Interaction  On a scale of 0 to 10, with 0 being no pain and 10 being severe pain, what is your pain level?: 10 - (Severe)  In the past six months, have you experienced urine leakage?: 0-No  At any time do you feel concerned for the safety/well-being of yourself and/or your children, in your home or elsewhere?: No  Have you had any immunizations at another office such as Influenza, Hepatitis B, Tetanus, or Pneumococcal?: No    Health Maintenance   Topic Date Due    COVID-19 Vaccine (5 - 2024-25 season) 09/01/2024    Annual Well Visit  Never done    Diabetes Care: Microalb/Creat Ratio (Annual)  01/01/2025    Mammogram  02/12/2025    Diabetes Care A1C  03/27/2025    Diabetes Care Dilated Eye Exam  06/22/2025    Diabetes Care: GFR  09/27/2025    Colorectal Cancer Screening  12/28/2028    Influenza  Vaccine  Completed    DEXA Scan  Completed    Annual Depression Screening  Completed    Fall Risk Screening (Annual)  Completed    Diabetes Care: Foot Exam (Annual)  Completed    Pneumococcal Vaccine: 50+ Years  Completed    Zoster Vaccines  Completed    Meningococcal B Vaccine  Aged Out

## 2025-01-14 ENCOUNTER — HOSPITAL ENCOUNTER (OUTPATIENT)
Dept: CT IMAGING | Age: 65
Discharge: HOME | End: 2025-01-14
Attending: INTERNAL MEDICINE
Payer: MEDICARE

## 2025-01-14 DIAGNOSIS — R91.1 SOLID NODULE OF LUNG 6 MM TO 8 MM IN DIAMETER: ICD-10-CM

## 2025-01-14 PROCEDURE — 71250 CT THORAX DX C-: CPT | Performed by: INTERNAL MEDICINE

## 2025-01-17 DIAGNOSIS — E11.9 TYPE 2 DIABETES MELLITUS WITHOUT COMPLICATION, WITHOUT LONG-TERM CURRENT USE OF INSULIN (HCC): Primary | ICD-10-CM

## 2025-01-22 NOTE — TELEPHONE ENCOUNTER
Patient returned call, reviewed labs and recommendations. Patient verbalizes understanding  
None known

## 2025-01-30 ENCOUNTER — HOSPITAL ENCOUNTER (OUTPATIENT)
Facility: HOSPITAL | Age: 65
Setting detail: HOSPITAL OUTPATIENT SURGERY
Discharge: HOME | End: 2025-01-30
Attending: ORTHOPAEDIC SURGERY | Admitting: ORTHOPAEDIC SURGERY
Payer: MEDICARE

## 2025-01-30 VITALS
TEMPERATURE: 97 F | OXYGEN SATURATION: 98 % | SYSTOLIC BLOOD PRESSURE: 116 MMHG | DIASTOLIC BLOOD PRESSURE: 78 MMHG | HEIGHT: 64 IN | RESPIRATION RATE: 16 BRPM | BODY MASS INDEX: 32.26 KG/M2 | HEART RATE: 72 BPM | WEIGHT: 188.94 LBS

## 2025-01-30 DIAGNOSIS — G89.18 POST-OP PAIN: Primary | ICD-10-CM

## 2025-01-30 LAB — GLUCOSE BLD-MCNC: 140 MG/DL (ref 70–99)

## 2025-01-30 PROCEDURE — 82962 GLUCOSE BLOOD TEST: CPT

## 2025-01-30 PROCEDURE — 01N54ZZ RELEASE MEDIAN NERVE, PERCUTANEOUS ENDOSCOPIC APPROACH: ICD-10-PCS | Performed by: ORTHOPAEDIC SURGERY

## 2025-01-30 RX ORDER — TRAMADOL HYDROCHLORIDE 50 MG/1
50 TABLET ORAL EVERY 6 HOURS PRN
Qty: 5 TABLET | Refills: 0 | Status: SHIPPED | OUTPATIENT
Start: 2025-01-30

## 2025-01-30 NOTE — OPERATIVE REPORT
Operative Report     Patient Name: Susan Will    1/30/2025    Preoperative Diagnosis:     Carpal tunnel syndrome of right wrist [G56.01]    Postoperative Diagnosis:     Same as above    Surgeons and Role:     * Douglas Tobias MD - Primary     Assistant: Brittney Estrada PA-C    A PA was needed for the successful completion of this case. She was essential for the proper positioning of patient, manipulation of instruments, proper exposure, manipulation of soft tissue, and wound closure.    Procedures:     Right endoscopic carpal tunnel release (CPT 11336)    Antibiotics: Ancef 2g    Surgical Findings: Normal Anatomy     Anesthesia: Local + Local    Complications: None    Condition: Stable    Estimated Blood Loss: 1mL    Indications:  65 year old female with EMG/NCV confirmed right carpal tunnel syndrome that failed non-surgical management. Patient consented to an endoscopic carpal tunnel release possible open having understood the risks associated with surgery, expected outcome, time to recovery and the need for possible rehab.  Risks that were discussed but not limited to infection, nerve injury, tendon injury, artery injury, need for additional surgery, and no improvements of present symptoms.      Procedure:  Patient was met in the preoperative holding area where consent was verified, laterality was marked with the surgeon's initials, and the H&P was updated. Local anesthetic was injected. Patient was brought to the operating room on a transport cart. Patient was then transferred onto the operating room table and placed in supine position with an arm table and with bony prominences well-padded.  SCDs were placed.  Antibiotics were fully infused. An upper arm tourniquet was placed and the limb was exsanguinated using Esmarch bandage. The arm was then prepped and draped in the usual sterile fashion. A surgical timeout was performed.Tourniquet was raised to 250mmHg.    A transverse incision through the dermis  was made 5mm proximal to the distal volar wrist crease just ulnar to the palmaris longus using a 15 blade.  Adson's and Tatum scissors was used to dissect through the subcutaneous tissue onto the antebrachial fascia.  A distally based 1 cm wide rectangular flap was elevated using a [place] blade.  The flap was retracted distally and volarly allowing access to the carpal tunnel.  The undersurface of the transverse carpal ligament was accessed, cleaned, and dilated.  After assessing the width of the ligament, the microaire endoscopic apparatus with camera was inserted into the carpal tunnel.  Under camera magnification with direct visualization of the undersurface of the transverse carpal ligament, the blade was engaged at the distal extents of the ligament and the release of the ligament was carried out distal to proximal.  I verified the release with the endoscopy camera noting divergent edges of the transverse carpal ligament.  I also physically verified with a tenosynovium elevator complete release of the transverse carpal ligament.      The tourniquet was then lowered and bipolar cautery was used to achieve hemostasis.       Closure:  The incision was closed using 5-0 Monocryl in a buried simple interrupted fashion.  Skin glue and Steri-Strips were placed.     Dressing/Splint:  Tegaderm with a pad was applied over the endoscopic carpal tunnel incision.     Post Operative:  Patient was woken up from anesthesia and taken to PACU for further recovery and discharge home.    Douglas Tobias MD   Hand, Wrist, & Elbow Surgery  [email or web address]  t: [phone]  f: [phone]

## 2025-01-30 NOTE — H&P
Clinic Note      Assessment/Plan:  65 year old female    Right carpal tunnel syndrome -EMG/NCV confirmed at [facility]-the more symptomatic side we will proceed with decompression of the median nerve.  Patient has failed nonsurgical management which includes bracing and therapy.  CTR Consent: Non-surgical and surgical treatment options where reviewed with the patient. Patient elected to proceed with surgical decompression of median nerve in the carpal tunnel. Patient consented to surgery after having understood the risks associated with surgery, expected outcomes, time to recovery and the possible need for therapy post-operatively. Risks include but not limited to: infection, wound complication, nerve injury resulting in temporary or permanent nerve damage, finger/hand stiffness, persistent pain/symptoms, swelling, CRPS, recurrence of carpal tunnel syndrome, and need for additional surgery.  Surgery scheduled for January 30, 2025  Left carpal tunnel syndrome -EMG/NCV confirmed at [facility]-we will proceed with the left side 6 weeks later  CTR Consent: Non-surgical and surgical treatment options where reviewed with the patient. Patient elected to proceed with surgical decompression of median nerve in the carpal tunnel. Patient consented to surgery after having understood the risks associated with surgery, expected outcomes, time to recovery and the possible need for therapy post-operatively. Risks include but not limited to: infection, wound complication, nerve injury resulting in temporary or permanent nerve damage, finger/hand stiffness, persistent pain/symptoms, swelling, CRPS, recurrence of carpal tunnel syndrome, and need for additional surgery.  Surgery scheduled for March 13, 2025    Follow Up: Day of surgery    Diagnostic Studies:     EMG/NCV: Performed at [facility] 5+ years ago showing carpal tunnel syndrome bilaterally.  No axonal loss was noted at that time     Physical Exam:    /90 (BP Location: Right arm)   Pulse  80   Temp 98 °F (36.7 °C) (Temporal)   Resp 16   Ht 5' 4\" (1.626 m)   Wt 188 lb 15 oz (85.7 kg)   LMP  (LMP Unknown)   SpO2 97%   BMI 32.43 kg/m²     Constitutional: NAD. AOx3. Well-developed and Well-nourished.   Psychiatric: Normal mood/ affect/ behavior. Judgment and thought content normal.     Bilateral Upper Extremity:   Inspection: Skin Intact. No skin lesions. No gross deformity.   Palpation:  No focal areas of TTP   Motion: Elbow: normal bilateral symmetric ext/flex  Wrist: normal bilateral symmetric ext/flex/sup/pro  Finger: full composite fist   Vascular 2+ radial pulse       Median Nerve Exam Right Left   Phdurkan + +   Sensation normal, tingling is noted in the fingertips normal, tingling is noted in the fingertips   PCBr Sensation normal normal   APB Weakness No No   Thenar Atrophy No No     Ulnar Nerve Exam Right Left   Sensation normal normal   1st JONATHAN Weakness No No   Hypothenar Atrophy No No     Radial Nerve Exam  Right Left   Radial Sensory normal normal       CC: Bilateral hand pain    HPI: This 65 year old RHD female presents with numbness and tingling in bilateral hands.  Patient reports constant tingling.  Intermittent numbness.  Is been going on since 2018.  She is done bracing at nighttime as well as therapy with progressively worsening symptoms.  The right side is worse than the left.  She wakes up 2 times per night.  Symptom pain is severe      Past Medical History:    Arthritis    Asthma (HCC)    Diabetes (HCC)    Esophageal reflux    High blood pressure    High cholesterol    Hypertension    Osteoarthritis    Visual impairment    Glasses     Past Surgical History:   Procedure Laterality Date    Colonoscopy  2023    Colonoscopy  2023    Hysterectomy            Tubal ligation  20years     No current outpatient medications on file.     Allergies[1]  Family History   Problem Relation Age of Onset    Diabetes Mother     Other (circulation problems) Mother     Colon  Cancer Father     Cancer Father      Social History     Occupational History    Not on file   Tobacco Use    Smoking status: Never    Smokeless tobacco: Never   Vaping Use    Vaping status: Never Used   Substance and Sexual Activity    Alcohol use: Never    Drug use: Never    Sexual activity: Not on file        Review of Systems (negative unless bolded):  General: fevers, chills, fatigue  CV:  chest pain, palpitations, leg swelling  Msk: bodyaches, neck pain, neck stiffness  Skin: rashes, open wounds, nonhealing ulcers  Hem: bleeds easily, bruise easily, immunocompromised  Neuro: dizziness, light headedness, headaches  Psych: anxious, depressed, anger issues    Douglas Tobias MD   Hand, Wrist, & Elbow Surgery  [email or web address]  t: [phone]  f: [phone]         [1] No Known Allergies

## 2025-01-30 NOTE — DISCHARGE INSTRUCTIONS
What to expect after a carpal tunnel release     Immediately after your surgery, keep your hand elevated (fingers up pointing to ceiling) as much as possible for the first 7 days. Icing is good too, but not as important as elevation. It is normal for your fingers to swell and have discoloration (bruising) appear a couple days after surgery into your fingers or elbow. You should begin using your hand for light activities - writing, typing, dressing and feeding yourself immediately. Move your fingers and make a fist ten times every hour while awake. Refrain from lifting greater than 2 pounds for the first 2 weeks after your surgery to allow the surgical site to heal completely.    Patient’s pain level is variable after this procedure most patients average a 2-3 out of 10 on a pain scale.. After the numbness wears off you can take pain medication as needed, including an anti-inflammatory (i.e. Aleve, Motrin, ibuprofen) as long as you don’t have any contraindications to taking these.    Keep the clear Tegaderm dressing on for 5 days. You can get it wet right away when washing hands or showering. You can continue to let it get wet after the clear dressing is removed. There are steri strips underneath that can get wet and will fall off on their own. Please refrain from soaking your hand in a bath or dishwater for 3 weeks following your surgery.    You will see Brittney Estrada PA-C or Dr Tobias at your post op visit approximately 7-14 days after surgery.  An appointment was made for you but if you do not have an appointment or that time does not work please call the office     If you have any questions or concerns please reach out. Our office number is: [phone].

## 2025-02-07 ENCOUNTER — OFFICE VISIT (OUTPATIENT)
Dept: ORTHOPEDICS CLINIC | Facility: CLINIC | Age: 65
End: 2025-02-07
Payer: COMMERCIAL

## 2025-02-07 VITALS — HEIGHT: 64 IN | WEIGHT: 188 LBS | BODY MASS INDEX: 32.1 KG/M2

## 2025-02-07 DIAGNOSIS — G56.01 CARPAL TUNNEL SYNDROME OF RIGHT WRIST: ICD-10-CM

## 2025-02-07 DIAGNOSIS — G56.02 CARPAL TUNNEL SYNDROME OF LEFT WRIST: Primary | ICD-10-CM

## 2025-02-07 PROCEDURE — 1125F AMNT PAIN NOTED PAIN PRSNT: CPT | Performed by: PHYSICIAN ASSISTANT

## 2025-02-07 PROCEDURE — 1159F MED LIST DOCD IN RCRD: CPT | Performed by: PHYSICIAN ASSISTANT

## 2025-02-07 PROCEDURE — 3008F BODY MASS INDEX DOCD: CPT | Performed by: PHYSICIAN ASSISTANT

## 2025-02-07 PROCEDURE — 1160F RVW MEDS BY RX/DR IN RCRD: CPT | Performed by: PHYSICIAN ASSISTANT

## 2025-02-07 PROCEDURE — 99024 POSTOP FOLLOW-UP VISIT: CPT | Performed by: PHYSICIAN ASSISTANT

## 2025-02-07 NOTE — PROGRESS NOTES
Clinic Note      Assessment/Plan:  65 year old female    Right endoscopic carpal tunnel lease 1/30/2025-she has had improvements in tingling and pain but still has numbness to the distal tips which we will monitor.  She can progress to activities as tolerated.  All of her questions were answered.  Left carpal tunnel syndrome -EMG/NCV confirmed at [facility]-we will proceed with the left side 6 weeks later  CTR Consent: Non-surgical and surgical treatment options where reviewed with the patient. Patient elected to proceed with surgical decompression of median nerve in the carpal tunnel. Patient consented to surgery after having understood the risks associated with surgery, expected outcomes, time to recovery and the possible need for therapy post-operatively. Risks include but not limited to: infection, wound complication, nerve injury resulting in temporary or permanent nerve damage, finger/hand stiffness, persistent pain/symptoms, swelling, CRPS, recurrence of carpal tunnel syndrome, and need for additional surgery.  Surgery scheduled for March 13, 2025    Follow Up: Day of surgery    Diagnostic Studies:     EMG/NCV: Performed at [facility] 5+ years ago showing carpal tunnel syndrome bilaterally.  No axonal loss was noted at that time     Physical Exam:    Ht 5' 4\" (1.626 m)   Wt 188 lb (85.3 kg)   BMI 32.27 kg/m²     Constitutional: NAD. AOx3. Well-developed and Well-nourished.   Psychiatric: Normal mood/ affect/ behavior. Judgment and thought content normal.     Bilateral Upper Extremity:   Inspection:  incision healed well with no signs of infection   Palpation:  No focal areas of TTP   Motion: Elbow: normal bilateral symmetric ext/flex  Wrist: normal bilateral symmetric ext/flex/sup/pro  Finger: full composite fist   Vascular 2+ radial pulse       Median Nerve Exam Right Left   Phdurkan + +   Sensation normal, tingling is noted in the fingertips normal, tingling is noted in the fingertips   PCBr Sensation normal normal    APB Weakness No No   Thenar Atrophy No No     Ulnar Nerve Exam Right Left   Sensation normal normal   1st JONATHAN Weakness No No   Hypothenar Atrophy No No     Radial Nerve Exam  Right Left   Radial Sensory normal normal       CC: Bilateral hand pain    HPI: This 65 year old RHD female presents with numbness and tingling in bilateral hands.  Patient reports constant tingling.  Intermittent numbness.  Is been going on since 2018.  She is done bracing at nighttime as well as therapy with progressively worsening symptoms.  The right side is worse than the left.  She wakes up 2 times per night.  Symptom pain is severe    Interval history: Patient underwent carpal tunnel release and her postop pain is slowly improving.  She has had improvements in numbness and tingling.  She still has numbness to the tips of the fingers.    Past Medical History:    Arthritis    Asthma (HCC)    Diabetes (HCC)    Esophageal reflux    High blood pressure    High cholesterol    Hypertension    Osteoarthritis    Visual impairment    Glasses     Past Surgical History:   Procedure Laterality Date    Colonoscopy  2023    Colonoscopy  2023    Hysterectomy            Tubal ligation  20years     Current Outpatient Medications   Medication Sig Dispense Refill    atorvastatin 20 MG Oral Tab Take 1 tablet (20 mg total) by mouth daily. 90 tablet 3    Cholecalciferol 50 MCG (2000 UT) Oral Cap Take 2,000 Units by mouth daily. 90 capsule 3    ketoconazole 2 % External Cream Apply 1 Application topically 2 (two) times daily. 60 g 11    Losartan Potassium-HCTZ 100-12.5 MG Oral Tab Take 1 tablet by mouth daily. 90 tablet 3    metFORMIN HCl 1000 MG Oral Tab Take 1 tablet (1,000 mg total) by mouth in the morning and 1 tablet (1,000 mg total) before bedtime. 180 tablet 3    VENTOLIN  (90 Base) MCG/ACT Inhalation Aero Soln Inhale 2 puffs into the lungs every 4 (four) hours as needed for Wheezing or Shortness of Breath. 3 each 3     Omeprazole 40 MG Oral Capsule Delayed Release Take 1 capsule (40 mg total) by mouth daily. 90 capsule 3    fluticasone-salmeterol (ADVAIR DISKUS) 100-50 MCG/ACT Inhalation Aerosol Powder, Breath Activated Inhale 1 puff into the lungs 2 (two) times daily. (Patient taking differently: Inhale 1 puff into the lungs 2 (two) times daily as needed.) 180 each 3    FEROSUL 325 (65 Fe) MG Oral Tab TAKE 1 TABLET BY MOUTH DAILY WITH BREAKFAST 30 tablet 5    budesonide 0.5 MG/2ML Inhalation Suspension Take 2 mL (0.5 mg total) by nebulization daily. (Patient taking differently: Take 2 mL (0.5 mg total) by nebulization daily as needed.) 120 mL 5    TURMERIC OR Take 1 tablet by mouth As Directed.      ascorbic acid 500 MG Oral Chew Tab Chew 1 tablet (500 mg total) by mouth daily.      diclofenac 1 % External Gel Apply 1 Application topically as needed.      fluticasone propionate 50 MCG/ACT Nasal Suspension 1 spray by Nasal route daily.      Multiple Vitamins-Minerals (ONE-A-DAY WOMENS 50 PLUS) Oral Tab Take 1 tablet by mouth daily.      Omega-3 Fatty Acids (FISH OIL) 1200 MG Oral Capsule Delayed Release Take 1 tablet by mouth daily.      triamcinolone 0.1 % External Ointment Apply 1 Application topically 2 (two) times daily.      Accu-Chek Softclix Lancets Does not apply Misc Use as directed to measure blood sugar once daily. 100 each 3    Blood Glucose Monitoring Suppl (ACCU-CHEK GUIDE ME) w/Device Does not apply Kit 1 each daily. Use as directed to measure blood sugar once daily. 1 kit 0    benzonatate 200 MG Oral Cap Take 1 capsule (200 mg total) by mouth 3 (three) times daily as needed for cough. 30 capsule 0    traMADol 50 MG Oral Tab Take 1 tablet (50 mg total) by mouth every 6 (six) hours as needed for Pain. (Patient not taking: Reported on 2/7/2025) 5 tablet 0    Nebulizers (VIOS AEROSOL DELIVERY SYSTEM) Does not apply Misc Take 1 Bottle by mouth As Directed. (Patient not taking: Reported on 2/7/2025)      Respiratory  Therapy Supplies (FULL KIT NEBULIZER SET) Does not apply Misc 1 kit As Directed. (Patient not taking: Reported on 2/7/2025) 1 each 0     Allergies[1]  Family History   Problem Relation Age of Onset    Diabetes Mother     Other (circulation problems) Mother     Colon Cancer Father     Cancer Father      Social History     Occupational History    Not on file   Tobacco Use    Smoking status: Never    Smokeless tobacco: Never   Vaping Use    Vaping status: Never Used   Substance and Sexual Activity    Alcohol use: Never    Drug use: Never    Sexual activity: Not on file        Review of Systems (negative unless bolded):  General: fevers, chills, fatigue  CV:  chest pain, palpitations, leg swelling  Msk: bodyaches, neck pain, neck stiffness  Skin: rashes, open wounds, nonhealing ulcers  Hem: bleeds easily, bruise easily, immunocompromised  Neuro: dizziness, light headedness, headaches  Psych: anxious, depressed, anger issues  Brittney Estrada PA-C  Hand, Wrist, & Elbow Surgery  Physician Assistant to Dr. Douglas [email or web address]  t: [phone]  f: [phone]         [1] No Known Allergies

## 2025-02-19 ENCOUNTER — HOSPITAL ENCOUNTER (OUTPATIENT)
Dept: MAMMOGRAPHY | Age: 65
Discharge: HOME | End: 2025-02-19
Attending: FAMILY MEDICINE
Payer: MEDICARE

## 2025-02-19 DIAGNOSIS — Z12.31 ENCOUNTER FOR SCREENING MAMMOGRAM FOR MALIGNANT NEOPLASM OF BREAST: ICD-10-CM

## 2025-02-19 PROCEDURE — 77067 SCR MAMMO BI INCL CAD: CPT | Performed by: FAMILY MEDICINE

## 2025-02-19 PROCEDURE — 77063 BREAST TOMOSYNTHESIS BI: CPT | Performed by: FAMILY MEDICINE

## 2025-02-28 ENCOUNTER — PATIENT MESSAGE (OUTPATIENT)
Dept: ORTHOPEDICS CLINIC | Facility: CLINIC | Age: 65
End: 2025-02-28

## 2025-03-03 ENCOUNTER — TELEPHONE (OUTPATIENT)
Dept: ORTHOPEDICS CLINIC | Facility: CLINIC | Age: 65
End: 2025-03-03

## 2025-03-03 DIAGNOSIS — G56.01 CARPAL TUNNEL SYNDROME OF RIGHT WRIST: Primary | ICD-10-CM

## 2025-07-14 ENCOUNTER — LAB ENCOUNTER (OUTPATIENT)
Dept: LAB | Age: 65
End: 2025-07-14
Attending: FAMILY MEDICINE
Payer: MEDICARE

## 2025-07-14 ENCOUNTER — OFFICE VISIT (OUTPATIENT)
Dept: FAMILY MEDICINE CLINIC | Facility: CLINIC | Age: 65
End: 2025-07-14
Payer: COMMERCIAL

## 2025-07-14 VITALS
DIASTOLIC BLOOD PRESSURE: 72 MMHG | SYSTOLIC BLOOD PRESSURE: 116 MMHG | HEART RATE: 84 BPM | HEIGHT: 64 IN | RESPIRATION RATE: 18 BRPM | WEIGHT: 193 LBS | OXYGEN SATURATION: 97 % | BODY MASS INDEX: 32.95 KG/M2

## 2025-07-14 DIAGNOSIS — E11.9 TYPE 2 DIABETES MELLITUS WITHOUT COMPLICATION, WITHOUT LONG-TERM CURRENT USE OF INSULIN (HCC): Primary | ICD-10-CM

## 2025-07-14 DIAGNOSIS — E11.9 TYPE 2 DIABETES MELLITUS WITHOUT COMPLICATION, WITHOUT LONG-TERM CURRENT USE OF INSULIN (HCC): ICD-10-CM

## 2025-07-14 DIAGNOSIS — K21.9 GASTROESOPHAGEAL REFLUX DISEASE, UNSPECIFIED WHETHER ESOPHAGITIS PRESENT: ICD-10-CM

## 2025-07-14 DIAGNOSIS — E78.5 HYPERLIPIDEMIA, UNSPECIFIED HYPERLIPIDEMIA TYPE: ICD-10-CM

## 2025-07-14 DIAGNOSIS — I10 ESSENTIAL HYPERTENSION: ICD-10-CM

## 2025-07-14 DIAGNOSIS — G56.02 CARPAL TUNNEL SYNDROME OF LEFT WRIST: ICD-10-CM

## 2025-07-14 DIAGNOSIS — J45.40 MODERATE PERSISTENT ASTHMA WITHOUT COMPLICATION (HCC): ICD-10-CM

## 2025-07-14 LAB
ALBUMIN SERPL-MCNC: 4.5 G/DL (ref 3.2–4.8)
ALBUMIN/GLOB SERPL: 1.8 {RATIO} (ref 1–2)
ALP LIVER SERPL-CCNC: 62 U/L (ref 55–142)
ALT SERPL-CCNC: 21 U/L (ref 10–49)
ANION GAP SERPL CALC-SCNC: 10 MMOL/L (ref 0–18)
AST SERPL-CCNC: 21 U/L (ref ?–34)
BILIRUB SERPL-MCNC: 0.5 MG/DL (ref 0.2–1.1)
BUN BLD-MCNC: 10 MG/DL (ref 9–23)
CALCIUM BLD-MCNC: 9.4 MG/DL (ref 8.7–10.6)
CHLORIDE SERPL-SCNC: 103 MMOL/L (ref 98–112)
CHOLEST SERPL-MCNC: 137 MG/DL (ref ?–200)
CO2 SERPL-SCNC: 28 MMOL/L (ref 21–32)
CREAT BLD-MCNC: 1 MG/DL (ref 0.55–1.02)
EGFRCR SERPLBLD CKD-EPI 2021: 62 ML/MIN/1.73M2 (ref 60–?)
EST. AVERAGE GLUCOSE BLD GHB EST-MCNC: 171 MG/DL (ref 68–126)
FASTING PATIENT LIPID ANSWER: YES
FASTING STATUS PATIENT QL REPORTED: YES
GLOBULIN PLAS-MCNC: 2.5 G/DL (ref 2–3.5)
GLUCOSE BLD-MCNC: 178 MG/DL (ref 70–99)
HBA1C MFR BLD: 7.6 % (ref ?–5.7)
HDLC SERPL-MCNC: 89 MG/DL (ref 40–59)
LDLC SERPL CALC-MCNC: 36 MG/DL (ref ?–100)
NONHDLC SERPL-MCNC: 48 MG/DL (ref ?–130)
OSMOLALITY SERPL CALC.SUM OF ELEC: 295 MOSM/KG (ref 275–295)
POTASSIUM SERPL-SCNC: 4.2 MMOL/L (ref 3.5–5.1)
PROT SERPL-MCNC: 7 G/DL (ref 5.7–8.2)
SODIUM SERPL-SCNC: 141 MMOL/L (ref 136–145)
TRIGL SERPL-MCNC: 52 MG/DL (ref 30–149)
VLDLC SERPL CALC-MCNC: 7 MG/DL (ref 0–30)

## 2025-07-14 PROCEDURE — 83036 HEMOGLOBIN GLYCOSYLATED A1C: CPT

## 2025-07-14 PROCEDURE — 3061F NEG MICROALBUMINURIA REV: CPT | Performed by: FAMILY MEDICINE

## 2025-07-14 PROCEDURE — 1160F RVW MEDS BY RX/DR IN RCRD: CPT | Performed by: FAMILY MEDICINE

## 2025-07-14 PROCEDURE — 3008F BODY MASS INDEX DOCD: CPT | Performed by: FAMILY MEDICINE

## 2025-07-14 PROCEDURE — 36415 COLL VENOUS BLD VENIPUNCTURE: CPT

## 2025-07-14 PROCEDURE — G2211 COMPLEX E/M VISIT ADD ON: HCPCS | Performed by: FAMILY MEDICINE

## 2025-07-14 PROCEDURE — 3074F SYST BP LT 130 MM HG: CPT | Performed by: FAMILY MEDICINE

## 2025-07-14 PROCEDURE — 1159F MED LIST DOCD IN RCRD: CPT | Performed by: FAMILY MEDICINE

## 2025-07-14 PROCEDURE — 80053 COMPREHEN METABOLIC PANEL: CPT

## 2025-07-14 PROCEDURE — 99214 OFFICE O/P EST MOD 30 MIN: CPT | Performed by: FAMILY MEDICINE

## 2025-07-14 PROCEDURE — 3044F HG A1C LEVEL LT 7.0%: CPT | Performed by: FAMILY MEDICINE

## 2025-07-14 PROCEDURE — 80061 LIPID PANEL: CPT

## 2025-07-14 PROCEDURE — 3078F DIAST BP <80 MM HG: CPT | Performed by: FAMILY MEDICINE

## 2025-07-14 RX ORDER — ALBUTEROL SULFATE 90 UG/1
2 INHALANT RESPIRATORY (INHALATION)
Qty: 3 EACH | Refills: 3 | Status: SHIPPED | OUTPATIENT
Start: 2025-07-14

## 2025-07-14 RX ORDER — FLUTICASONE FUROATE AND VILANTEROL 100; 25 UG/1; UG/1
1 POWDER RESPIRATORY (INHALATION) DAILY
Qty: 60 EACH | Refills: 3 | Status: SHIPPED | OUTPATIENT
Start: 2025-07-14

## 2025-07-14 NOTE — PROGRESS NOTES
The following individual(s) verbally consented to be recorded using ambient AI listening technology and understand that they can each withdraw their consent to this listening technology at any point by asking the clinician to turn off or pause the recording:    Patient name: Susan Will  Additional names:

## 2025-07-14 NOTE — PROGRESS NOTES
[facility] Office Note  Chief Complaint:   Chief Complaint   Patient presents with    Follow - Up     6 month     Diabetes    Hypertension    Medication Follow-Up     Pt needs alternative inhaler for asthma, due to insurance not covering certain inhalers        HPI:     History of Present Illness  Susan Will is a 65 year old female who presents for follow-up and medication management.    Her left carpal tunnel release surgery was put on hold. Planning to get it done next spring. She is considering using her wrist splint for this as she has been symptomatic lately.     She has diabetes and is currently taking metformin. She is due for blood work to monitor her diabetes management.    She has asthma and has been experiencing issues with her inhalers due to insurance coverage. She was previously using Advair and Ventolin. Her cough is intermittent, and she uses budesonide nebulizer solution as needed, although her insurance does not cover much of it.    She has a history of hypertension and hyperlipidemia, for which she is on medication.    She mentions gaining five pounds recently and acknowledges a decrease in physical activity, particularly walking. She plans to resume her exercise routine.    She experiences knee discomfort, particularly in the evening, which she attributes to arthritis. She uses over-the-counter arthritis Tylenol and a compression knee sleeve for relief.    She has a history of acid reflux but is no longer taking omeprazole, as she has been managing it through dietary changes.      Past Medical History[1]  Past Surgical History[2]  Social History:  Short Social Hx on File[3]  Family History:  Family History[4]  Allergies:  Allergies[5]  Current Meds:  Current Medications[6]   Counseling given: Not Answered       REVIEW OF SYSTEMS:   Review of Systems   Constitutional: Negative.    HENT: Negative.     Eyes: Negative.    Respiratory: Negative.     Cardiovascular:  Negative.    Gastrointestinal: Negative.    Endocrine: Negative.    Genitourinary: Negative.    Musculoskeletal:  Positive for arthralgias.   Skin: Negative.    Neurological: Negative.    Psychiatric/Behavioral: Negative.          EXAM:   /72   Pulse 84   Resp 18   Ht 5' 4\" (1.626 m)   Wt 193 lb (87.5 kg)   SpO2 97%   BMI 33.13 kg/m²  Estimated body mass index is 33.13 kg/m² as calculated from the following:    Height as of this encounter: 5' 4\" (1.626 m).    Weight as of this encounter: 193 lb (87.5 kg).   Vital signs reviewed.Appears stated age, well groomed.  Physical Exam  Constitutional:       Appearance: Normal appearance.   HENT:      Head: Normocephalic and atraumatic.   Eyes:      Pupils: Pupils are equal, round, and reactive to light.   Cardiovascular:      Rate and Rhythm: Normal rate and regular rhythm.      Pulses: Normal pulses.      Heart sounds: Normal heart sounds. No murmur heard.  Pulmonary:      Effort: Pulmonary effort is normal. No respiratory distress.      Breath sounds: Normal breath sounds. No wheezing or rhonchi.   Abdominal:      General: Abdomen is flat. Bowel sounds are normal. There is no distension.      Palpations: Abdomen is soft. There is no mass.      Tenderness: There is no abdominal tenderness.      Hernia: No hernia is present.   Musculoskeletal:      Right knee: No swelling. Normal range of motion. No tenderness.      Left knee: No swelling. Normal range of motion. No tenderness.      Right lower leg: No edema.      Left lower leg: No edema.   Skin:     Findings: No rash.   Neurological:      General: No focal deficit present.      Mental Status: She is alert and oriented to person, place, and time.          ASSESSMENT AND PLAN:   1. Type 2 diabetes mellitus without complication, without long-term current use of insulin (HCC)  Diabetes: A1c is 6.8 done 1/13/2025 which shows Excellent control. Continue current meds and lifestyle modification.  Last External A1c was  7.3.   DM Meds: metFORMIN HCl Tabs - 1000 MG      Diabetic Complications: CKD 2 (GFR 79).  None  Diabetes is : stable Continue current treatment regimen. Reassess Diabetes in : in 6 months  Due for labs/diabetic ey exam.     2. Essential hypertension  BP shows good control with last BP of 116/72. Continue lifestyle changes, diet, exercise and weight loss.   1/13/2025: Potassium 4.0; Creatinine 0.82; eGFR-Cr 79  BP Meds: Losartan Potassium-HCTZ Tabs - 100-12.5 MG     3. Moderate persistent asthma without complication (HCC)  Will send in alternatives.   Use as directed. F/u if no improvement or worsening of symptoms.  - fluticasone furoate-vilanterol (BREO ELLIPTA) 100-25 MCG/ACT Inhalation Aerosol Powder, Breath Activated; Inhale 1 puff into the lungs daily.  Dispense: 60 each; Refill: 3  - Albuterol Sulfate, sensor, (PROAIR DIGIHALER) 108 (90 Base) MCG/ACT Inhalation Aerosol Powder, Breath Activated; Inhale 2 puffs into the lungs every 4 to 6 hours as needed (shortness of breath, wheezing.).  Dispense: 3 each; Refill: 3    4. Hyperlipidemia, unspecified hyperlipidemia type  Stable, CPM. Due for labs.     5. Carpal tunnel syndrome of left wrist  Continue supportive care measures, wrist splints, activity modification etc. Planning for surgery in the spring.     6. Gastroesophageal reflux disease, unspecified whether esophagitis present  Improving with dietary control. CPM.       Meds & Refills for this Visit:  Requested Prescriptions     Signed Prescriptions Disp Refills    fluticasone furoate-vilanterol (BREO ELLIPTA) 100-25 MCG/ACT Inhalation Aerosol Powder, Breath Activated 60 each 3     Sig: Inhale 1 puff into the lungs daily.    Albuterol Sulfate, sensor, (PROAIR DIGIHALER) 108 (90 Base) MCG/ACT Inhalation Aerosol Powder, Breath Activated 3 each 3     Sig: Inhale 2 puffs into the lungs every 4 to 6 hours as needed (shortness of breath, wheezing.).       Health Maintenance:  Health Maintenance Due   Topic Date Due     COVID-19 Vaccine ( season) 2024    Annual Well Visit  Never done    Diabetes Care Dilated Eye Exam  2025    Diabetes Care A1C  2025       Patient/Caregiver Education: Patient/Caregiver Education: There are no barriers to learning. Medical education done.   Outcome: Patient verbalizes understanding. Patient is notified to call with any questions, complications, allergies, or worsening or changing symptoms.  Patient is to call with any side effects or complications from the treatments as a result of today.     Problem List:  Problem List[7]       [1]   Past Medical History:   Arthritis    Asthma (HCC)    Diabetes (HCC)    Esophageal reflux    High blood pressure    High cholesterol    Hypertension    Osteoarthritis    Visual impairment    Glasses   [2]   Past Surgical History:  Procedure Laterality Date    Colonoscopy  2023    Colonoscopy  2023    Hysterectomy            Tubal ligation  20years   [3]   Social History  Socioeconomic History    Marital status:    Tobacco Use    Smoking status: Never    Smokeless tobacco: Never   Vaping Use    Vaping status: Never Used   Substance and Sexual Activity    Alcohol use: Never    Drug use: Never   Other Topics Concern    Caffeine Concern No    Exercise No    Seat Belt No    Special Diet No    Stress Concern No    Weight Concern No     Social Drivers of Health     Food Insecurity: No Food Insecurity (2025)    NCSS - Food Insecurity     Worried About Running Out of Food in the Last Year: No     Ran Out of Food in the Last Year: No   Transportation Needs: No Transportation Needs (2025)    NCSS - Transportation     Lack of Transportation: No   Housing Stability: Not At Risk (2025)    NCSS - Housing/Utilities     Has Housing: Yes     Worried About Losing Housing: No     Unable to Get Utilities: No   [4]   Family History  Problem Relation Age of Onset    Diabetes Mother     Other (circulation problems) Mother      Colon Cancer Father     Cancer Father    [5] No Known Allergies  [6]   Current Outpatient Medications   Medication Sig Dispense Refill    fluticasone furoate-vilanterol (BREO ELLIPTA) 100-25 MCG/ACT Inhalation Aerosol Powder, Breath Activated Inhale 1 puff into the lungs daily. 60 each 3    Albuterol Sulfate, sensor, (PROAIR DIGIHALER) 108 (90 Base) MCG/ACT Inhalation Aerosol Powder, Breath Activated Inhale 2 puffs into the lungs every 4 to 6 hours as needed (shortness of breath, wheezing.). 3 each 3    atorvastatin 20 MG Oral Tab Take 1 tablet (20 mg total) by mouth daily. 90 tablet 3    Cholecalciferol 50 MCG (2000 UT) Oral Cap Take 2,000 Units by mouth daily. 90 capsule 3    ketoconazole 2 % External Cream Apply 1 Application topically 2 (two) times daily. 60 g 11    Losartan Potassium-HCTZ 100-12.5 MG Oral Tab Take 1 tablet by mouth daily. 90 tablet 3    metFORMIN HCl 1000 MG Oral Tab Take 1 tablet (1,000 mg total) by mouth in the morning and 1 tablet (1,000 mg total) before bedtime. 180 tablet 3    FEROSUL 325 (65 Fe) MG Oral Tab TAKE 1 TABLET BY MOUTH DAILY WITH BREAKFAST 30 tablet 5    TURMERIC OR Take 1 tablet by mouth As Directed.      ascorbic acid 500 MG Oral Chew Tab Chew 1 tablet (500 mg total) by mouth daily.      diclofenac 1 % External Gel Apply 1 Application topically as needed.      fluticasone propionate 50 MCG/ACT Nasal Suspension 1 spray by Nasal route daily.      Multiple Vitamins-Minerals (ONE-A-DAY WOMENS 50 PLUS) Oral Tab Take 1 tablet by mouth daily.      Omega-3 Fatty Acids (FISH OIL) 1200 MG Oral Capsule Delayed Release Take 1 tablet by mouth daily.      triamcinolone 0.1 % External Ointment Apply 1 Application topically 2 (two) times daily.      Accu-Chek Softclix Lancets Does not apply Misc Use as directed to measure blood sugar once daily. 100 each 3    Blood Glucose Monitoring Suppl (ACCU-CHEK GUIDE ME) w/Device Does not apply Kit 1 each daily. Use as directed to measure blood  sugar once daily. 1 kit 0    benzonatate 200 MG Oral Cap Take 1 capsule (200 mg total) by mouth 3 (three) times daily as needed for cough. 30 capsule 0    VENTOLIN  (90 Base) MCG/ACT Inhalation Aero Soln Inhale 2 puffs into the lungs every 4 (four) hours as needed for Wheezing or Shortness of Breath. (Patient not taking: Reported on 7/14/2025) 3 each 3   [7]   Patient Active Problem List  Diagnosis    Moderate persistent asthma without complication (HCC)    Hyperlipidemia    Essential hypertension    Type 2 diabetes mellitus without complication, without long-term current use of insulin (HCC)    Gastroesophageal reflux disease    Post-op pain

## 2025-07-16 ENCOUNTER — TELEPHONE (OUTPATIENT)
Dept: FAMILY MEDICINE CLINIC | Facility: CLINIC | Age: 65
End: 2025-07-16

## 2025-07-16 DIAGNOSIS — J45.40 MODERATE PERSISTENT ASTHMA WITHOUT COMPLICATION (HCC): Primary | ICD-10-CM

## 2025-07-16 RX ORDER — ALBUTEROL SULFATE 90 UG/1
2 INHALANT RESPIRATORY (INHALATION) EVERY 4 HOURS PRN
Qty: 3 EACH | Refills: 3 | Status: SHIPPED | OUTPATIENT
Start: 2025-07-16

## 2025-07-16 NOTE — TELEPHONE ENCOUNTER
Echo with [facility]'s pharmacy called to request change in medication Rx.   PROAIR DIGIHALER is not available (on backorder).   Echo asked if Dr. Tobias change Rx to regular Proair HFA.  Rx pended.

## 2025-07-21 ENCOUNTER — PATIENT MESSAGE (OUTPATIENT)
Dept: ORTHOPEDICS CLINIC | Facility: CLINIC | Age: 65
End: 2025-07-21

## 2025-08-14 ENCOUNTER — HOSPITAL ENCOUNTER (OUTPATIENT)
Facility: HOSPITAL | Age: 65
Setting detail: HOSPITAL OUTPATIENT SURGERY
Discharge: HOME | End: 2025-08-14
Attending: ORTHOPAEDIC SURGERY | Admitting: ORTHOPAEDIC SURGERY

## 2025-08-14 VITALS
RESPIRATION RATE: 16 BRPM | TEMPERATURE: 97 F | WEIGHT: 192 LBS | SYSTOLIC BLOOD PRESSURE: 118 MMHG | BODY MASS INDEX: 32.78 KG/M2 | HEIGHT: 64 IN | HEART RATE: 74 BPM | OXYGEN SATURATION: 98 % | DIASTOLIC BLOOD PRESSURE: 64 MMHG

## 2025-08-14 DIAGNOSIS — G89.18 POST-OP PAIN: Primary | ICD-10-CM

## 2025-08-14 RX ORDER — TRAMADOL HYDROCHLORIDE 50 MG/1
50 TABLET ORAL EVERY 6 HOURS PRN
Qty: 5 TABLET | Refills: 0 | Status: SHIPPED | OUTPATIENT
Start: 2025-08-14

## 2025-08-22 ENCOUNTER — OFFICE VISIT (OUTPATIENT)
Dept: ORTHOPEDICS CLINIC | Facility: CLINIC | Age: 65
End: 2025-08-22

## 2025-08-22 VITALS — HEIGHT: 64 IN | WEIGHT: 192 LBS | BODY MASS INDEX: 32.78 KG/M2

## 2025-08-22 DIAGNOSIS — G56.01 CARPAL TUNNEL SYNDROME OF RIGHT WRIST: ICD-10-CM

## 2025-08-22 DIAGNOSIS — G56.02 CARPAL TUNNEL SYNDROME OF LEFT WRIST: Primary | ICD-10-CM

## 2025-08-22 PROCEDURE — 3008F BODY MASS INDEX DOCD: CPT | Performed by: PHYSICIAN ASSISTANT

## 2025-08-22 PROCEDURE — 99024 POSTOP FOLLOW-UP VISIT: CPT | Performed by: PHYSICIAN ASSISTANT

## 2025-08-22 PROCEDURE — 1159F MED LIST DOCD IN RCRD: CPT | Performed by: PHYSICIAN ASSISTANT

## 2025-08-22 PROCEDURE — 1126F AMNT PAIN NOTED NONE PRSNT: CPT | Performed by: PHYSICIAN ASSISTANT

## 2025-08-22 PROCEDURE — 1160F RVW MEDS BY RX/DR IN RCRD: CPT | Performed by: PHYSICIAN ASSISTANT

## (undated) DEVICE — GLOVE SUR 6.5 SENSICARE PI PIP CRM PWD F

## (undated) DEVICE — GLOVE SUR 7.5 SENSICARE PI PIP GRN PWD F

## (undated) DEVICE — 3M™ STERI-STRIP™ REINFORCED ADHESIVE SKIN CLOSURES, R1547, 1/2 IN X 4 IN (12 MM X 100 MM), 6 STRIPS/ENVELOPE: Brand: 3M™ STERI-STRIP™

## (undated) DEVICE — UPPER EXTREMITY CDS-LF: Brand: MEDLINE INDUSTRIES, INC.

## (undated) DEVICE — ANTISEPTIC 4OZ 70% ISO ALC

## (undated) DEVICE — GLOVE SUR 7.5 SENSICARE PI PIP CRM PWD F

## (undated) DEVICE — Device

## (undated) DEVICE — LOW PROFILE (LP) BLADE ASSEMBLY 6PK: Brand: MICROAIRE®

## (undated) DEVICE — SOLUTION IRRIG 1000ML 0.9% NACL USP BTL

## (undated) DEVICE — 3M™ TEGADERM™ +PAD FILM DRESSING WITH NON-ADHERENT PAD, 3587, 3-1/2 IN X 4-1/8 IN (9 CM X 10.5 CM), 25/CAR, 4 CAR/CS: Brand: 3M™ TEGADERM™

## (undated) DEVICE — DISPOSABLE BIPOLAR FORCEPS 4" (10.2CM) JEWELERS, STRAIGHT 0.4MM TIP AND 12 FT. (3.6M) CABLE: Brand: KIRWAN

## (undated) DEVICE — CUFF TRNQT 3IN X 18IN RED CYL SGL BLDR 2 PRT

## (undated) DEVICE — GLOVE SUR 7 SENSICARE PI PIP CRM PWD F

## (undated) DEVICE — ADHESIVE SKIN TOP FOR WND CLSR DERMBND ADV

## (undated) DEVICE — SUT MCRYL 5-0 18IN P-3 ABSRB UD 13MM 3/8 CIR

## (undated) DEVICE — SOLUTION ANTIFOG W/ ADH BK FOAM SPNG RADPQ

## (undated) DEVICE — GOWN,SIRUS,FABRIC-REINFORCED,X-LARGE: Brand: MEDLINE

## (undated) DEVICE — SPONGE 4X4 10PK

## (undated) DEVICE — PACK UPPER EXTREMITY

## (undated) DEVICE — MINI-BLADE®: Brand: BEAVER®

## (undated) DEVICE — KIT,ANTI FOG,W/SPONGE & FLUID,SOFT PACK: Brand: MEDLINE

## (undated) DEVICE — DISPOSABLE TOURNIQUET CUFF SINGLE BLADDER, DUAL PORT AND QUICK CONNECT CONNECTOR: Brand: COLOR CUFF

## (undated) NOTE — LETTER
[place] Pre-Admission Testing Department  Phone: [phone]  Right Fax: [phone]  CLARIFICATION FOR E-SSS    Sent By:   [id number] Date: 2025     Patient Name: Susan Will  Surgery Date: 2025  CSN: [id number]     Medical Record: [id number]  : 1960      Age: 65 year old        Sex: female    Surgeons and Role:     * Douglas Tobias MD - Primary  FAX: [phone]  Procedure Comments:  Right endoscopic carpal tunnel release, possible open    YOU SHOULD RECEIVE 1 PAGES (INCLUDING COVER SHEET)    Please note that clarification is needed on the Electronic-Surgery Scheduling Sheet (E-SSS)  the original is included with this letter. Please have physician review and make changes on the faxed copy of the E-SSS.    Procedure per patient should be done under local anesthesia.  Does not want to be asleep for procedure.       Please review and submit change if needed        ALL CHANGES MUST BE DOCUMENTED ON THE E-SSS AND SIGNED BY THE PHYSICIAN    After physician has made the changes and signed the E-SSS please fax it to [phone] and these changes will then be made in Epic by the OR schedulers.     If you have any questions please call Pre-Admission Testing at [phone]    Thank you,    Pre-Admission Testing    [address]  [address]  Phone: [phone]  Fax: [phone]  [email or web address]    STATEMENT OF CONFIDENTIALITY: This transmittal is intended only for the use of the individual entity to which is addressed and may contain information that is privileged and confidential. information contained. If the reader of this message IS NOT the intended recipient, you are hereby notified that any disclosure, distribution or copying of this information is strictly prohibited. If you have received this transmission in error, please notify us immediately by telephone and return the original documents to us at the above address via the [employer]  [facility] [employer]. Thank you.

## (undated) NOTE — LETTER
02/23/24        Dear Susan,    As your primary care doctor, I am leading the team of health care professionals watching over your diabetes. Diabetes is a disease that we can manage together. Our goal is to prevent damage to your body from this disease.     Diabetes can harm the retina in your eye which can lead to vision loss or blindness. This damage is called diabetic retinopathy. Patients often don’t have signs that the retina is becoming damaged and the first sign of a problem can be blurry vision. There are treatments that work well to stop the progression of retinopathy if caught early. Therefore, it is very important to have your eyes checked just as we check your blood sugars. A dilated eye exam only needs to be completed once a year.     Our records indicate you have diabetes and have not had an eye exam this year. If our records are incorrect and you have had an eye exam,  please submit the following information by replying to this message:   Name of the doctor who performed the exam, Office location, and Date of the exam.  We will contact their office and obtain the results.      If a referral is required by your insurance plan, please reply to this MyChart communication and our office will obtain the referral for you.    We also can offer you a diabetic retinal eye exam in our office.  To qualify for this exam you have to be 22 years old and older and have a diagnosis of diabetes mellitus.  Please contact our office to discuss further.     Once again, your team at [facility]  is working under my direction to provide you with the best care and keep you on a healthy course.     Most sincerely,   Dr Axel Tobias       [phone]